# Patient Record
Sex: MALE | Race: WHITE | Employment: OTHER | URBAN - METROPOLITAN AREA
[De-identification: names, ages, dates, MRNs, and addresses within clinical notes are randomized per-mention and may not be internally consistent; named-entity substitution may affect disease eponyms.]

---

## 2017-04-02 PROBLEM — R26.81 UNSTEADY GAIT: Status: ACTIVE | Noted: 2017-04-02

## 2017-04-03 PROBLEM — R26.81 UNSTEADY GAIT: Status: RESOLVED | Noted: 2017-04-02 | Resolved: 2017-04-03

## 2017-04-03 PROBLEM — I95.1 ORTHOSTATIC HYPOTENSION: Chronic | Status: ACTIVE | Noted: 2017-04-03

## 2017-04-03 PROBLEM — Z86.718 HISTORY OF DVT (DEEP VEIN THROMBOSIS): Chronic | Status: ACTIVE | Noted: 2017-04-03

## 2017-04-03 PROBLEM — I50.33 ACUTE ON CHRONIC DIASTOLIC CONGESTIVE HEART FAILURE (HCC): Status: ACTIVE | Noted: 2017-04-03

## 2017-04-03 PROBLEM — J44.9 COPD (CHRONIC OBSTRUCTIVE PULMONARY DISEASE) (HCC): Chronic | Status: ACTIVE | Noted: 2017-04-03

## 2017-05-24 PROBLEM — A41.9 SEPTIC SHOCK (HCC): Status: ACTIVE | Noted: 2017-05-24

## 2017-05-24 PROBLEM — R65.21 SEPTIC SHOCK (HCC): Status: ACTIVE | Noted: 2017-05-24

## 2017-05-25 PROBLEM — E83.42 HYPOMAGNESEMIA: Status: ACTIVE | Noted: 2017-05-25

## 2017-05-25 PROBLEM — E83.39 HYPOPHOSPHATEMIA: Status: ACTIVE | Noted: 2017-05-25

## 2017-05-25 PROBLEM — E87.6 HYPOKALEMIA: Status: ACTIVE | Noted: 2017-05-25

## 2017-09-29 PROBLEM — E87.70 VOLUME OVERLOAD: Status: ACTIVE | Noted: 2017-09-29

## 2017-09-29 PROBLEM — R31.9 HEMATURIA: Status: ACTIVE | Noted: 2017-09-29

## 2017-10-01 PROBLEM — E44.0 MODERATE PROTEIN-CALORIE MALNUTRITION (HCC): Status: ACTIVE | Noted: 2017-10-01

## 2018-01-01 ENCOUNTER — APPOINTMENT (OUTPATIENT)
Dept: GENERAL RADIOLOGY | Age: 78
End: 2018-01-01
Payer: COMMERCIAL

## 2018-01-01 ENCOUNTER — HOSPITAL ENCOUNTER (INPATIENT)
Age: 78
LOS: 3 days | Discharge: NURSING, REHAB OR LONG-TERM CARE | DRG: 293 | End: 2018-06-07
Attending: EMERGENCY MEDICINE | Admitting: INTERNAL MEDICINE
Payer: COMMERCIAL

## 2018-01-01 ENCOUNTER — APPOINTMENT (OUTPATIENT)
Dept: GENERAL RADIOLOGY | Age: 78
DRG: 293 | End: 2018-01-01
Payer: COMMERCIAL

## 2018-01-01 ENCOUNTER — APPOINTMENT (OUTPATIENT)
Dept: CT IMAGING | Age: 78
DRG: 293 | End: 2018-01-01
Payer: COMMERCIAL

## 2018-01-01 ENCOUNTER — APPOINTMENT (OUTPATIENT)
Dept: CT IMAGING | Age: 78
End: 2018-01-01
Payer: COMMERCIAL

## 2018-01-01 ENCOUNTER — HOSPITAL ENCOUNTER (EMERGENCY)
Age: 78
Discharge: HOME | End: 2018-06-18
Attending: EMERGENCY MEDICINE
Payer: COMMERCIAL

## 2018-01-01 ENCOUNTER — HOSPITAL ENCOUNTER (INPATIENT)
Age: 78
LOS: 3 days | Discharge: NURSING, REHAB OR LONG-TERM CARE | DRG: 700 | End: 2018-04-19
Attending: EMERGENCY MEDICINE | Admitting: UROLOGY
Payer: COMMERCIAL

## 2018-01-01 ENCOUNTER — APPOINTMENT (OUTPATIENT)
Dept: CT IMAGING | Age: 78
DRG: 700 | End: 2018-01-01
Payer: COMMERCIAL

## 2018-01-01 VITALS
TEMPERATURE: 97.6 F | WEIGHT: 215 LBS | BODY MASS INDEX: 30.1 KG/M2 | RESPIRATION RATE: 18 BRPM | SYSTOLIC BLOOD PRESSURE: 133 MMHG | HEIGHT: 71 IN | HEART RATE: 79 BPM | DIASTOLIC BLOOD PRESSURE: 74 MMHG | OXYGEN SATURATION: 98 %

## 2018-01-01 VITALS
WEIGHT: 195 LBS | SYSTOLIC BLOOD PRESSURE: 114 MMHG | HEART RATE: 82 BPM | TEMPERATURE: 98.7 F | HEIGHT: 70 IN | RESPIRATION RATE: 18 BRPM | BODY MASS INDEX: 27.92 KG/M2 | DIASTOLIC BLOOD PRESSURE: 62 MMHG | OXYGEN SATURATION: 98 %

## 2018-01-01 VITALS
OXYGEN SATURATION: 98 % | BODY MASS INDEX: 30.1 KG/M2 | SYSTOLIC BLOOD PRESSURE: 102 MMHG | HEIGHT: 71 IN | TEMPERATURE: 97.6 F | WEIGHT: 215 LBS | HEART RATE: 75 BPM | RESPIRATION RATE: 16 BRPM | DIASTOLIC BLOOD PRESSURE: 60 MMHG

## 2018-01-01 DIAGNOSIS — R41.0 CONFUSION: Primary | ICD-10-CM

## 2018-01-01 DIAGNOSIS — N39.0 URINARY TRACT INFECTION WITH HEMATURIA, SITE UNSPECIFIED: ICD-10-CM

## 2018-01-01 DIAGNOSIS — R60.0 LEG EDEMA: Primary | ICD-10-CM

## 2018-01-01 DIAGNOSIS — N30.00 ACUTE CYSTITIS WITHOUT HEMATURIA: ICD-10-CM

## 2018-01-01 DIAGNOSIS — T83.9XXA PROBLEM WITH FOLEY CATHETER, INITIAL ENCOUNTER (HCC): ICD-10-CM

## 2018-01-01 DIAGNOSIS — N39.0 URINARY TRACT INFECTION WITHOUT HEMATURIA, SITE UNSPECIFIED: ICD-10-CM

## 2018-01-01 DIAGNOSIS — R33.9 URINARY RETENTION: Primary | ICD-10-CM

## 2018-01-01 DIAGNOSIS — R31.9 URINARY TRACT INFECTION WITH HEMATURIA, SITE UNSPECIFIED: ICD-10-CM

## 2018-01-01 DIAGNOSIS — R18.8 OTHER ASCITES: ICD-10-CM

## 2018-01-01 LAB
ACETAMINOPHEN LEVEL: <5 MCG/ML (ref 10–30)
ALBUMIN SERPL-MCNC: 2 G/DL (ref 3.5–5.2)
ALBUMIN SERPL-MCNC: 2.2 G/DL (ref 3.5–5.2)
ALBUMIN SERPL-MCNC: 2.7 G/DL (ref 3.5–5.2)
ALP BLD-CCNC: 60 U/L (ref 40–129)
ALP BLD-CCNC: 73 U/L (ref 40–129)
ALP BLD-CCNC: 80 U/L (ref 40–129)
ALT SERPL-CCNC: 10 U/L (ref 0–40)
ALT SERPL-CCNC: 17 U/L (ref 0–40)
ALT SERPL-CCNC: 8 U/L (ref 0–40)
AMMONIA: 23.7 UMOL/L (ref 16–60)
AMORPHOUS: ABNORMAL
AMPHETAMINE SCREEN, URINE: NOT DETECTED
ANION GAP SERPL CALCULATED.3IONS-SCNC: 10 MMOL/L (ref 7–16)
ANION GAP SERPL CALCULATED.3IONS-SCNC: 11 MMOL/L (ref 7–16)
ANION GAP SERPL CALCULATED.3IONS-SCNC: 12 MMOL/L (ref 7–16)
ANION GAP SERPL CALCULATED.3IONS-SCNC: 12 MMOL/L (ref 7–16)
ANION GAP SERPL CALCULATED.3IONS-SCNC: 9 MMOL/L (ref 7–16)
AST SERPL-CCNC: 13 U/L (ref 0–39)
AST SERPL-CCNC: 18 U/L (ref 0–39)
AST SERPL-CCNC: 31 U/L (ref 0–39)
BACTERIA: ABNORMAL /HPF
BARBITURATE SCREEN URINE: NOT DETECTED
BASOPHILS ABSOLUTE: 0 E9/L (ref 0–0.2)
BASOPHILS ABSOLUTE: 0.06 E9/L (ref 0–0.2)
BASOPHILS ABSOLUTE: 0.14 E9/L (ref 0–0.2)
BASOPHILS RELATIVE PERCENT: 0.9 % (ref 0–2)
BASOPHILS RELATIVE PERCENT: 1 % (ref 0–2)
BASOPHILS RELATIVE PERCENT: 2.6 % (ref 0–2)
BENZODIAZEPINE SCREEN, URINE: NOT DETECTED
BILIRUB SERPL-MCNC: 0.4 MG/DL (ref 0–1.2)
BILIRUB SERPL-MCNC: 0.4 MG/DL (ref 0–1.2)
BILIRUB SERPL-MCNC: 0.5 MG/DL (ref 0–1.2)
BILIRUBIN URINE: ABNORMAL
BILIRUBIN URINE: NEGATIVE
BILIRUBIN URINE: NEGATIVE
BLOOD CULTURE, ROUTINE: NORMAL
BLOOD, URINE: ABNORMAL
BUN BLDV-MCNC: 10 MG/DL (ref 8–23)
BUN BLDV-MCNC: 12 MG/DL (ref 8–23)
BUN BLDV-MCNC: 14 MG/DL (ref 8–23)
BUN BLDV-MCNC: 14 MG/DL (ref 8–23)
BUN BLDV-MCNC: 5 MG/DL (ref 8–23)
BUN BLDV-MCNC: 8 MG/DL (ref 8–23)
BUN BLDV-MCNC: 9 MG/DL (ref 8–23)
CALCIUM SERPL-MCNC: 7.4 MG/DL (ref 8.6–10.2)
CALCIUM SERPL-MCNC: 7.7 MG/DL (ref 8.6–10.2)
CALCIUM SERPL-MCNC: 7.8 MG/DL (ref 8.6–10.2)
CALCIUM SERPL-MCNC: 8.2 MG/DL (ref 8.6–10.2)
CALCIUM SERPL-MCNC: 8.3 MG/DL (ref 8.6–10.2)
CANNABINOID SCREEN URINE: NOT DETECTED
CHLORIDE BLD-SCNC: 100 MMOL/L (ref 98–107)
CHLORIDE BLD-SCNC: 101 MMOL/L (ref 98–107)
CHLORIDE BLD-SCNC: 101 MMOL/L (ref 98–107)
CHLORIDE BLD-SCNC: 103 MMOL/L (ref 98–107)
CHLORIDE BLD-SCNC: 99 MMOL/L (ref 98–107)
CHP ED QC CHECK: NORMAL
CLARITY: ABNORMAL
CO2: 24 MMOL/L (ref 22–29)
CO2: 25 MMOL/L (ref 22–29)
CO2: 26 MMOL/L (ref 22–29)
COCAINE METABOLITE SCREEN URINE: NOT DETECTED
COLOR: ABNORMAL
COLOR: YELLOW
COLOR: YELLOW
CREAT SERPL-MCNC: 0.7 MG/DL (ref 0.7–1.2)
CREAT SERPL-MCNC: 0.8 MG/DL (ref 0.7–1.2)
CREAT SERPL-MCNC: 0.8 MG/DL (ref 0.7–1.2)
CREAT SERPL-MCNC: 0.9 MG/DL (ref 0.7–1.2)
CRYSTALS, UA: ABNORMAL
EKG ATRIAL RATE: 74 BPM
EKG ATRIAL RATE: 81 BPM
EKG P AXIS: 12 DEGREES
EKG P AXIS: 63 DEGREES
EKG P-R INTERVAL: 164 MS
EKG P-R INTERVAL: 192 MS
EKG Q-T INTERVAL: 380 MS
EKG Q-T INTERVAL: 382 MS
EKG QRS DURATION: 74 MS
EKG QRS DURATION: 80 MS
EKG QTC CALCULATION (BAZETT): 424 MS
EKG QTC CALCULATION (BAZETT): 441 MS
EKG R AXIS: -12 DEGREES
EKG R AXIS: -6 DEGREES
EKG T AXIS: 53 DEGREES
EKG T AXIS: 63 DEGREES
EKG VENTRICULAR RATE: 74 BPM
EKG VENTRICULAR RATE: 81 BPM
EOSINOPHILS ABSOLUTE: 0.18 E9/L (ref 0.05–0.5)
EOSINOPHILS ABSOLUTE: 0.25 E9/L (ref 0.05–0.5)
EOSINOPHILS ABSOLUTE: 0.41 E9/L (ref 0.05–0.5)
EOSINOPHILS RELATIVE PERCENT: 2.8 % (ref 0–6)
EOSINOPHILS RELATIVE PERCENT: 3.5 % (ref 0–6)
EOSINOPHILS RELATIVE PERCENT: 7.8 % (ref 0–6)
EPITHELIAL CELLS, UA: ABNORMAL /HPF
EPITHELIAL CELLS, UA: ABNORMAL /HPF
ETHANOL: <10 MG/DL (ref 0–0.08)
GFR AFRICAN AMERICAN: >60
GFR NON-AFRICAN AMERICAN: >60 ML/MIN/1.73
GLUCOSE BLD-MCNC: 107 MG/DL (ref 74–109)
GLUCOSE BLD-MCNC: 150 MG/DL (ref 74–109)
GLUCOSE BLD-MCNC: 180 MG/DL (ref 74–109)
GLUCOSE BLD-MCNC: 185 MG/DL (ref 74–109)
GLUCOSE BLD-MCNC: 192 MG/DL
GLUCOSE BLD-MCNC: 205 MG/DL (ref 74–109)
GLUCOSE BLD-MCNC: 222 MG/DL (ref 74–109)
GLUCOSE BLD-MCNC: 226 MG/DL (ref 74–109)
GLUCOSE URINE: NEGATIVE MG/DL
HBA1C MFR BLD: 7.1 % (ref 4.8–5.9)
HCT VFR BLD CALC: 36.5 % (ref 37–54)
HCT VFR BLD CALC: 37.1 % (ref 37–54)
HCT VFR BLD CALC: 37.4 % (ref 37–54)
HCT VFR BLD CALC: 40.7 % (ref 37–54)
HCT VFR BLD CALC: 40.9 % (ref 37–54)
HEMOGLOBIN: 12.2 G/DL (ref 12.5–16.5)
HEMOGLOBIN: 12.4 G/DL (ref 12.5–16.5)
HEMOGLOBIN: 12.4 G/DL (ref 12.5–16.5)
HEMOGLOBIN: 13.6 G/DL (ref 12.5–16.5)
HEMOGLOBIN: 14 G/DL (ref 12.5–16.5)
IMMATURE GRANULOCYTES #: 0.04 E9/L
IMMATURE GRANULOCYTES %: 0.6 % (ref 0–5)
KETONES, URINE: 15 MG/DL
KETONES, URINE: ABNORMAL MG/DL
KETONES, URINE: NEGATIVE MG/DL
LACTIC ACID: 1.5 MMOL/L (ref 0.5–2.2)
LEUKOCYTE ESTERASE, URINE: ABNORMAL
LYMPHOCYTES ABSOLUTE: 0.14 E9/L (ref 1.5–4)
LYMPHOCYTES ABSOLUTE: 0.32 E9/L (ref 1.5–4)
LYMPHOCYTES ABSOLUTE: 0.51 E9/L (ref 1.5–4)
LYMPHOCYTES RELATIVE PERCENT: 1.7 % (ref 20–42)
LYMPHOCYTES RELATIVE PERCENT: 6.1 % (ref 20–42)
LYMPHOCYTES RELATIVE PERCENT: 8.1 % (ref 20–42)
MCH RBC QN AUTO: 30.5 PG (ref 26–35)
MCH RBC QN AUTO: 30.6 PG (ref 26–35)
MCH RBC QN AUTO: 30.8 PG (ref 26–35)
MCH RBC QN AUTO: 30.8 PG (ref 26–35)
MCH RBC QN AUTO: 31.5 PG (ref 26–35)
MCHC RBC AUTO-ENTMCNC: 32.9 % (ref 32–34.5)
MCHC RBC AUTO-ENTMCNC: 33.2 % (ref 32–34.5)
MCHC RBC AUTO-ENTMCNC: 33.3 % (ref 32–34.5)
MCHC RBC AUTO-ENTMCNC: 34 % (ref 32–34.5)
MCHC RBC AUTO-ENTMCNC: 34.4 % (ref 32–34.5)
MCV RBC AUTO: 89.9 FL (ref 80–99.9)
MCV RBC AUTO: 91.7 FL (ref 80–99.9)
MCV RBC AUTO: 92.3 FL (ref 80–99.9)
MCV RBC AUTO: 92.5 FL (ref 80–99.9)
MCV RBC AUTO: 93.7 FL (ref 80–99.9)
METER GLUCOSE: 122 MG/DL (ref 70–110)
METER GLUCOSE: 131 MG/DL (ref 70–110)
METER GLUCOSE: 134 MG/DL (ref 70–110)
METER GLUCOSE: 145 MG/DL (ref 70–110)
METER GLUCOSE: 162 MG/DL (ref 70–110)
METER GLUCOSE: 164 MG/DL (ref 70–110)
METER GLUCOSE: 166 MG/DL (ref 70–110)
METER GLUCOSE: 168 MG/DL (ref 70–110)
METER GLUCOSE: 183 MG/DL (ref 70–110)
METER GLUCOSE: 185 MG/DL (ref 70–110)
METER GLUCOSE: 192 MG/DL (ref 70–110)
METER GLUCOSE: 196 MG/DL (ref 70–110)
METER GLUCOSE: 200 MG/DL (ref 70–110)
METER GLUCOSE: 221 MG/DL (ref 70–110)
METER GLUCOSE: 221 MG/DL (ref 70–110)
METER GLUCOSE: 234 MG/DL (ref 70–110)
METER GLUCOSE: 242 MG/DL (ref 70–110)
METER GLUCOSE: 244 MG/DL (ref 70–110)
METER GLUCOSE: 248 MG/DL (ref 70–110)
METER GLUCOSE: 260 MG/DL (ref 70–110)
METER GLUCOSE: 267 MG/DL (ref 70–110)
METER GLUCOSE: 276 MG/DL (ref 70–110)
METER GLUCOSE: 294 MG/DL (ref 70–110)
METER GLUCOSE: 307 MG/DL (ref 70–110)
METER GLUCOSE: 312 MG/DL (ref 70–110)
METER GLUCOSE: 324 MG/DL (ref 70–110)
METER GLUCOSE: 341 MG/DL (ref 70–110)
METHADONE SCREEN, URINE: NOT DETECTED
MONOCYTES ABSOLUTE: 0.64 E9/L (ref 0.1–0.95)
MONOCYTES ABSOLUTE: 0.64 E9/L (ref 0.1–0.95)
MONOCYTES ABSOLUTE: 0.87 E9/L (ref 0.1–0.95)
MONOCYTES RELATIVE PERCENT: 12.2 % (ref 2–12)
MONOCYTES RELATIVE PERCENT: 13.7 % (ref 2–12)
MONOCYTES RELATIVE PERCENT: 8.7 % (ref 2–12)
NEUTROPHILS ABSOLUTE: 3.76 E9/L (ref 1.8–7.3)
NEUTROPHILS ABSOLUTE: 4.67 E9/L (ref 1.8–7.3)
NEUTROPHILS ABSOLUTE: 6.11 E9/L (ref 1.8–7.3)
NEUTROPHILS RELATIVE PERCENT: 71.3 % (ref 43–80)
NEUTROPHILS RELATIVE PERCENT: 73.9 % (ref 43–80)
NEUTROPHILS RELATIVE PERCENT: 86.1 % (ref 43–80)
NITRITE, URINE: POSITIVE
OPIATE SCREEN URINE: NOT DETECTED
ORGANISM: ABNORMAL
PDW BLD-RTO: 13.2 FL (ref 11.5–15)
PDW BLD-RTO: 13.6 FL (ref 11.5–15)
PDW BLD-RTO: 13.9 FL (ref 11.5–15)
PDW BLD-RTO: 14.3 FL (ref 11.5–15)
PDW BLD-RTO: 14.3 FL (ref 11.5–15)
PH UA: 5 (ref 5–9)
PH UA: 7 (ref 5–9)
PH UA: 7.5 (ref 5–9)
PHENCYCLIDINE SCREEN URINE: NOT DETECTED
PLATELET # BLD: 180 E9/L (ref 130–450)
PLATELET # BLD: 186 E9/L (ref 130–450)
PLATELET # BLD: 216 E9/L (ref 130–450)
PLATELET # BLD: 222 E9/L (ref 130–450)
PLATELET # BLD: 267 E9/L (ref 130–450)
PMV BLD AUTO: 10.2 FL (ref 7–12)
PMV BLD AUTO: 10.5 FL (ref 7–12)
PMV BLD AUTO: 10.7 FL (ref 7–12)
PMV BLD AUTO: 9.7 FL (ref 7–12)
PMV BLD AUTO: 9.8 FL (ref 7–12)
POLYCHROMASIA: ABNORMAL
POTASSIUM SERPL-SCNC: 3.6 MMOL/L (ref 3.5–5)
POTASSIUM SERPL-SCNC: 3.7 MMOL/L (ref 3.5–5)
POTASSIUM SERPL-SCNC: 3.8 MMOL/L (ref 3.5–5)
POTASSIUM SERPL-SCNC: 3.8 MMOL/L (ref 3.5–5)
POTASSIUM SERPL-SCNC: 4 MMOL/L (ref 3.5–5)
POTASSIUM SERPL-SCNC: 4.4 MMOL/L (ref 3.5–5)
POTASSIUM SERPL-SCNC: 5.5 MMOL/L (ref 3.5–5)
PRO-BNP: 729 PG/ML (ref 0–450)
PROPOXYPHENE SCREEN: NOT DETECTED
PROTEIN UA: 100 MG/DL
PROTEIN UA: 100 MG/DL
PROTEIN UA: ABNORMAL MG/DL
RBC # BLD: 3.96 E12/L (ref 3.8–5.8)
RBC # BLD: 4.05 E12/L (ref 3.8–5.8)
RBC # BLD: 4.06 E12/L (ref 3.8–5.8)
RBC # BLD: 4.42 E12/L (ref 3.8–5.8)
RBC # BLD: 4.44 E12/L (ref 3.8–5.8)
RBC # BLD: NORMAL 10*6/UL
RBC UA: ABNORMAL /HPF (ref 0–2)
SALICYLATE, SERUM: <0.3 MG/DL (ref 0–30)
SODIUM BLD-SCNC: 135 MMOL/L (ref 132–146)
SODIUM BLD-SCNC: 136 MMOL/L (ref 132–146)
SODIUM BLD-SCNC: 137 MMOL/L (ref 132–146)
SODIUM BLD-SCNC: 137 MMOL/L (ref 132–146)
SODIUM BLD-SCNC: 138 MMOL/L (ref 132–146)
SPECIFIC GRAVITY UA: 1.01 (ref 1–1.03)
SPECIFIC GRAVITY UA: 1.02 (ref 1–1.03)
SPECIFIC GRAVITY UA: >=1.03 (ref 1–1.03)
TOTAL PROTEIN: 5.1 G/DL (ref 6.4–8.3)
TOTAL PROTEIN: 5.3 G/DL (ref 6.4–8.3)
TOTAL PROTEIN: 6.6 G/DL (ref 6.4–8.3)
TRICYCLIC ANTIDEPRESSANTS SCREEN SERUM: NEGATIVE NG/ML
TROPONIN: 0.04 NG/ML (ref 0–0.03)
URINE CULTURE, ROUTINE: ABNORMAL
URINE CULTURE, ROUTINE: ABNORMAL
URINE CULTURE, ROUTINE: NORMAL
UROBILINOGEN, URINE: 0.2 E.U./DL
UROBILINOGEN, URINE: 0.2 E.U./DL
UROBILINOGEN, URINE: 1 E.U./DL
WBC # BLD: 4.6 E9/L (ref 4.5–11.5)
WBC # BLD: 5.3 E9/L (ref 4.5–11.5)
WBC # BLD: 6 E9/L (ref 4.5–11.5)
WBC # BLD: 6.3 E9/L (ref 4.5–11.5)
WBC # BLD: 7.1 E9/L (ref 4.5–11.5)
WBC UA: >20 /HPF (ref 0–5)
WBC UA: ABNORMAL /HPF (ref 0–5)
WBC UA: ABNORMAL /HPF (ref 0–5)

## 2018-01-01 PROCEDURE — 2580000003 HC RX 258: Performed by: INTERNAL MEDICINE

## 2018-01-01 PROCEDURE — 71046 X-RAY EXAM CHEST 2 VIEWS: CPT

## 2018-01-01 PROCEDURE — 96376 TX/PRO/DX INJ SAME DRUG ADON: CPT

## 2018-01-01 PROCEDURE — G0378 HOSPITAL OBSERVATION PER HR: HCPCS

## 2018-01-01 PROCEDURE — 6360000002 HC RX W HCPCS: Performed by: INTERNAL MEDICINE

## 2018-01-01 PROCEDURE — G8987 SELF CARE CURRENT STATUS: HCPCS

## 2018-01-01 PROCEDURE — G8979 MOBILITY GOAL STATUS: HCPCS | Performed by: PHYSICAL THERAPIST

## 2018-01-01 PROCEDURE — 82962 GLUCOSE BLOOD TEST: CPT

## 2018-01-01 PROCEDURE — 6370000000 HC RX 637 (ALT 250 FOR IP): Performed by: INTERNAL MEDICINE

## 2018-01-01 PROCEDURE — 84484 ASSAY OF TROPONIN QUANT: CPT

## 2018-01-01 PROCEDURE — 71045 X-RAY EXAM CHEST 1 VIEW: CPT

## 2018-01-01 PROCEDURE — 85027 COMPLETE CBC AUTOMATED: CPT

## 2018-01-01 PROCEDURE — 85025 COMPLETE CBC W/AUTO DIFF WBC: CPT

## 2018-01-01 PROCEDURE — 94760 N-INVAS EAR/PLS OXIMETRY 1: CPT

## 2018-01-01 PROCEDURE — 1200000000 HC SEMI PRIVATE

## 2018-01-01 PROCEDURE — 97535 SELF CARE MNGMENT TRAINING: CPT

## 2018-01-01 PROCEDURE — G8978 MOBILITY CURRENT STATUS: HCPCS

## 2018-01-01 PROCEDURE — 93005 ELECTROCARDIOGRAM TRACING: CPT | Performed by: EMERGENCY MEDICINE

## 2018-01-01 PROCEDURE — G8988 SELF CARE GOAL STATUS: HCPCS

## 2018-01-01 PROCEDURE — 80053 COMPREHEN METABOLIC PANEL: CPT

## 2018-01-01 PROCEDURE — 80048 BASIC METABOLIC PNL TOTAL CA: CPT

## 2018-01-01 PROCEDURE — 70450 CT HEAD/BRAIN W/O DYE: CPT

## 2018-01-01 PROCEDURE — 94664 DEMO&/EVAL PT USE INHALER: CPT

## 2018-01-01 PROCEDURE — 74176 CT ABD & PELVIS W/O CONTRAST: CPT

## 2018-01-01 PROCEDURE — 94640 AIRWAY INHALATION TREATMENT: CPT

## 2018-01-01 PROCEDURE — 36415 COLL VENOUS BLD VENIPUNCTURE: CPT

## 2018-01-01 PROCEDURE — 96374 THER/PROPH/DIAG INJ IV PUSH: CPT

## 2018-01-01 PROCEDURE — 97530 THERAPEUTIC ACTIVITIES: CPT

## 2018-01-01 PROCEDURE — 2700000000 HC OXYGEN THERAPY PER DAY

## 2018-01-01 PROCEDURE — 83605 ASSAY OF LACTIC ACID: CPT

## 2018-01-01 PROCEDURE — 2580000003 HC RX 258: Performed by: EMERGENCY MEDICINE

## 2018-01-01 PROCEDURE — G0480 DRUG TEST DEF 1-7 CLASSES: HCPCS

## 2018-01-01 PROCEDURE — 87186 SC STD MICRODIL/AGAR DIL: CPT

## 2018-01-01 PROCEDURE — 6360000002 HC RX W HCPCS: Performed by: EMERGENCY MEDICINE

## 2018-01-01 PROCEDURE — 83880 ASSAY OF NATRIURETIC PEPTIDE: CPT

## 2018-01-01 PROCEDURE — 99284 EMERGENCY DEPT VISIT MOD MDM: CPT

## 2018-01-01 PROCEDURE — 97162 PT EVAL MOD COMPLEX 30 MIN: CPT

## 2018-01-01 PROCEDURE — 87088 URINE BACTERIA CULTURE: CPT

## 2018-01-01 PROCEDURE — 97166 OT EVAL MOD COMPLEX 45 MIN: CPT

## 2018-01-01 PROCEDURE — G8979 MOBILITY GOAL STATUS: HCPCS

## 2018-01-01 PROCEDURE — 99285 EMERGENCY DEPT VISIT HI MDM: CPT

## 2018-01-01 PROCEDURE — 83036 HEMOGLOBIN GLYCOSYLATED A1C: CPT

## 2018-01-01 PROCEDURE — 87040 BLOOD CULTURE FOR BACTERIA: CPT

## 2018-01-01 PROCEDURE — G8978 MOBILITY CURRENT STATUS: HCPCS | Performed by: PHYSICAL THERAPIST

## 2018-01-01 PROCEDURE — 97110 THERAPEUTIC EXERCISES: CPT

## 2018-01-01 PROCEDURE — 81001 URINALYSIS AUTO W/SCOPE: CPT

## 2018-01-01 PROCEDURE — 82140 ASSAY OF AMMONIA: CPT

## 2018-01-01 PROCEDURE — 97161 PT EVAL LOW COMPLEX 20 MIN: CPT | Performed by: PHYSICAL THERAPIST

## 2018-01-01 PROCEDURE — 87077 CULTURE AEROBIC IDENTIFY: CPT

## 2018-01-01 PROCEDURE — 80307 DRUG TEST PRSMV CHEM ANLYZR: CPT

## 2018-01-01 RX ORDER — SODIUM CHLORIDE 0.9 % (FLUSH) 0.9 %
10 SYRINGE (ML) INJECTION EVERY 12 HOURS SCHEDULED
Status: DISCONTINUED | OUTPATIENT
Start: 2018-01-01 | End: 2018-01-01 | Stop reason: HOSPADM

## 2018-01-01 RX ORDER — SPIRONOLACTONE 25 MG/1
50 TABLET ORAL DAILY
Status: DISCONTINUED | OUTPATIENT
Start: 2018-01-01 | End: 2018-01-01 | Stop reason: HOSPADM

## 2018-01-01 RX ORDER — DEXTROSE MONOHYDRATE 50 MG/ML
100 INJECTION, SOLUTION INTRAVENOUS PRN
Status: DISCONTINUED | OUTPATIENT
Start: 2018-01-01 | End: 2018-01-01 | Stop reason: HOSPADM

## 2018-01-01 RX ORDER — CEPHALEXIN 250 MG/1
250 CAPSULE ORAL 3 TIMES DAILY
Qty: 21 CAPSULE | Refills: 0 | Status: SHIPPED | OUTPATIENT
Start: 2018-01-01 | End: 2018-01-01 | Stop reason: HOSPADM

## 2018-01-01 RX ORDER — FUROSEMIDE 20 MG/1
20 TABLET ORAL 2 TIMES DAILY
Status: DISCONTINUED | OUTPATIENT
Start: 2018-01-01 | End: 2018-01-01 | Stop reason: HOSPADM

## 2018-01-01 RX ORDER — DIVALPROEX SODIUM 250 MG/1
250 TABLET, EXTENDED RELEASE ORAL 2 TIMES DAILY
Status: DISCONTINUED | OUTPATIENT
Start: 2018-01-01 | End: 2018-01-01 | Stop reason: HOSPADM

## 2018-01-01 RX ORDER — THIAMINE MONONITRATE (VIT B1) 100 MG
100 TABLET ORAL DAILY
Status: DISCONTINUED | OUTPATIENT
Start: 2018-01-01 | End: 2018-01-01 | Stop reason: HOSPADM

## 2018-01-01 RX ORDER — ASPIRIN 81 MG/1
81 TABLET ORAL DAILY
Status: DISCONTINUED | OUTPATIENT
Start: 2018-01-01 | End: 2018-01-01 | Stop reason: HOSPADM

## 2018-01-01 RX ORDER — POTASSIUM CHLORIDE 20 MEQ/1
20 TABLET, EXTENDED RELEASE ORAL EVERY OTHER DAY
Status: DISCONTINUED | OUTPATIENT
Start: 2018-01-01 | End: 2018-01-01 | Stop reason: HOSPADM

## 2018-01-01 RX ORDER — DONEPEZIL HYDROCHLORIDE 5 MG/1
5 TABLET, FILM COATED ORAL NIGHTLY
Status: DISCONTINUED | OUTPATIENT
Start: 2018-01-01 | End: 2018-01-01 | Stop reason: HOSPADM

## 2018-01-01 RX ORDER — INSULIN GLARGINE 100 [IU]/ML
15 INJECTION, SOLUTION SUBCUTANEOUS NIGHTLY
Status: DISCONTINUED | OUTPATIENT
Start: 2018-01-01 | End: 2018-01-01 | Stop reason: HOSPADM

## 2018-01-01 RX ORDER — NICOTINE POLACRILEX 4 MG
15 LOZENGE BUCCAL PRN
Status: DISCONTINUED | OUTPATIENT
Start: 2018-01-01 | End: 2018-01-01 | Stop reason: HOSPADM

## 2018-01-01 RX ORDER — LANOLIN ALCOHOL/MO/W.PET/CERES
400 CREAM (GRAM) TOPICAL DAILY
Status: DISCONTINUED | OUTPATIENT
Start: 2018-01-01 | End: 2018-01-01 | Stop reason: HOSPADM

## 2018-01-01 RX ORDER — CEFTRIAXONE 1 G/1
1 INJECTION, POWDER, FOR SOLUTION INTRAMUSCULAR; INTRAVENOUS ONCE
Status: COMPLETED | OUTPATIENT
Start: 2018-01-01 | End: 2018-01-01

## 2018-01-01 RX ORDER — MIDODRINE HYDROCHLORIDE 5 MG/1
10 TABLET ORAL 3 TIMES DAILY
Status: DISCONTINUED | OUTPATIENT
Start: 2018-01-01 | End: 2018-01-01 | Stop reason: HOSPADM

## 2018-01-01 RX ORDER — CEFDINIR 300 MG/1
300 CAPSULE ORAL 2 TIMES DAILY
Qty: 20 CAPSULE | Refills: 0 | Status: SHIPPED | OUTPATIENT
Start: 2018-01-01 | End: 2018-01-01

## 2018-01-01 RX ORDER — CITALOPRAM 20 MG/1
20 TABLET ORAL DAILY
Status: DISCONTINUED | OUTPATIENT
Start: 2018-01-01 | End: 2018-01-01 | Stop reason: HOSPADM

## 2018-01-01 RX ORDER — SODIUM CHLORIDE 0.9 % (FLUSH) 0.9 %
10 SYRINGE (ML) INJECTION PRN
Status: DISCONTINUED | OUTPATIENT
Start: 2018-01-01 | End: 2018-01-01 | Stop reason: HOSPADM

## 2018-01-01 RX ORDER — INSULIN GLARGINE 100 [IU]/ML
10 INJECTION, SOLUTION SUBCUTANEOUS NIGHTLY
Status: DISCONTINUED | OUTPATIENT
Start: 2018-01-01 | End: 2018-01-01

## 2018-01-01 RX ORDER — FERROUS SULFATE 325(65) MG
325 TABLET ORAL 2 TIMES DAILY WITH MEALS
Status: DISCONTINUED | OUTPATIENT
Start: 2018-01-01 | End: 2018-01-01

## 2018-01-01 RX ORDER — SODIUM CHLORIDE 0.9 % (FLUSH) 0.9 %
10 SYRINGE (ML) INJECTION EVERY 12 HOURS SCHEDULED
Status: CANCELLED | OUTPATIENT
Start: 2018-01-01

## 2018-01-01 RX ORDER — LOPERAMIDE HYDROCHLORIDE 2 MG/1
2 CAPSULE ORAL 4 TIMES DAILY PRN
DISCHARGE
Start: 2018-01-01 | End: 2018-01-01

## 2018-01-01 RX ORDER — INSULIN GLARGINE 100 [IU]/ML
10 INJECTION, SOLUTION SUBCUTANEOUS NIGHTLY
Status: DISCONTINUED | OUTPATIENT
Start: 2018-01-01 | End: 2018-01-01 | Stop reason: HOSPADM

## 2018-01-01 RX ORDER — INSULIN GLARGINE 100 [IU]/ML
10 INJECTION, SOLUTION SUBCUTANEOUS NIGHTLY
Status: ON HOLD | COMMUNITY
End: 2019-01-01 | Stop reason: HOSPADM

## 2018-01-01 RX ORDER — PANTOPRAZOLE SODIUM 40 MG/1
40 TABLET, DELAYED RELEASE ORAL DAILY
Status: DISCONTINUED | OUTPATIENT
Start: 2018-01-01 | End: 2018-01-01 | Stop reason: HOSPADM

## 2018-01-01 RX ORDER — FOLIC ACID 1 MG/1
1 TABLET ORAL DAILY
Status: DISCONTINUED | OUTPATIENT
Start: 2018-01-01 | End: 2018-01-01 | Stop reason: HOSPADM

## 2018-01-01 RX ORDER — LOPERAMIDE HYDROCHLORIDE 2 MG/1
2 CAPSULE ORAL 4 TIMES DAILY PRN
Status: DISCONTINUED | OUTPATIENT
Start: 2018-01-01 | End: 2018-01-01 | Stop reason: HOSPADM

## 2018-01-01 RX ORDER — IPRATROPIUM BROMIDE AND ALBUTEROL SULFATE 2.5; .5 MG/3ML; MG/3ML
3 SOLUTION RESPIRATORY (INHALATION)
Status: DISCONTINUED | OUTPATIENT
Start: 2018-01-01 | End: 2018-01-01 | Stop reason: HOSPADM

## 2018-01-01 RX ORDER — DEXTROSE MONOHYDRATE 25 G/50ML
12.5 INJECTION, SOLUTION INTRAVENOUS PRN
Status: DISCONTINUED | OUTPATIENT
Start: 2018-01-01 | End: 2018-01-01 | Stop reason: HOSPADM

## 2018-01-01 RX ORDER — SODIUM CHLORIDE 0.9 % (FLUSH) 0.9 %
10 SYRINGE (ML) INJECTION PRN
Status: CANCELLED | OUTPATIENT
Start: 2018-01-01

## 2018-01-01 RX ORDER — ACETAMINOPHEN 325 MG/1
650 TABLET ORAL EVERY 4 HOURS PRN
Status: DISCONTINUED | OUTPATIENT
Start: 2018-01-01 | End: 2018-01-01 | Stop reason: HOSPADM

## 2018-01-01 RX ORDER — FUROSEMIDE 10 MG/ML
20 INJECTION INTRAMUSCULAR; INTRAVENOUS 2 TIMES DAILY
Status: DISCONTINUED | OUTPATIENT
Start: 2018-01-01 | End: 2018-01-01 | Stop reason: HOSPADM

## 2018-01-01 RX ORDER — FLUTICASONE PROPIONATE 50 MCG
2 SPRAY, SUSPENSION (ML) NASAL DAILY
Status: DISCONTINUED | OUTPATIENT
Start: 2018-01-01 | End: 2018-01-01 | Stop reason: HOSPADM

## 2018-01-01 RX ORDER — IPRATROPIUM BROMIDE AND ALBUTEROL SULFATE 2.5; .5 MG/3ML; MG/3ML
3 SOLUTION RESPIRATORY (INHALATION) 4 TIMES DAILY
Status: DISCONTINUED | OUTPATIENT
Start: 2018-01-01 | End: 2018-01-01 | Stop reason: HOSPADM

## 2018-01-01 RX ORDER — FERROUS SULFATE 325(65) MG
325 TABLET ORAL 2 TIMES DAILY WITH MEALS
Status: DISCONTINUED | OUTPATIENT
Start: 2018-01-01 | End: 2018-01-01 | Stop reason: HOSPADM

## 2018-01-01 RX ORDER — ACETAMINOPHEN 325 MG/1
650 TABLET ORAL EVERY 4 HOURS PRN
Status: CANCELLED | OUTPATIENT
Start: 2018-01-01

## 2018-01-01 RX ADMIN — PANTOPRAZOLE SODIUM 40 MG: 40 TABLET, DELAYED RELEASE ORAL at 08:31

## 2018-01-01 RX ADMIN — SPIRONOLACTONE 50 MG: 25 TABLET ORAL at 08:31

## 2018-01-01 RX ADMIN — MIDODRINE HYDROCHLORIDE 10 MG: 5 TABLET ORAL at 08:32

## 2018-01-01 RX ADMIN — INSULIN LISPRO 2 UNITS: 100 INJECTION, SOLUTION INTRAVENOUS; SUBCUTANEOUS at 17:07

## 2018-01-01 RX ADMIN — Medication 10 ML: at 20:49

## 2018-01-01 RX ADMIN — MIDODRINE HYDROCHLORIDE 10 MG: 5 TABLET ORAL at 15:25

## 2018-01-01 RX ADMIN — DIVALPROEX SODIUM 250 MG: 250 TABLET, FILM COATED, EXTENDED RELEASE ORAL at 09:24

## 2018-01-01 RX ADMIN — APIXABAN 5 MG: 5 TABLET, FILM COATED ORAL at 20:49

## 2018-01-01 RX ADMIN — IPRATROPIUM BROMIDE AND ALBUTEROL SULFATE 3 ML: 2.5; .5 SOLUTION RESPIRATORY (INHALATION) at 22:59

## 2018-01-01 RX ADMIN — CITALOPRAM 20 MG: 20 TABLET, FILM COATED ORAL at 11:36

## 2018-01-01 RX ADMIN — INSULIN LISPRO 1 UNITS: 100 INJECTION, SOLUTION INTRAVENOUS; SUBCUTANEOUS at 12:53

## 2018-01-01 RX ADMIN — Medication 10 ML: at 22:12

## 2018-01-01 RX ADMIN — MIDODRINE HYDROCHLORIDE 10 MG: 5 TABLET ORAL at 08:44

## 2018-01-01 RX ADMIN — Medication 100 MG: at 08:44

## 2018-01-01 RX ADMIN — MAGNESIUM GLUCONATE 500 MG ORAL TABLET 400 MG: 500 TABLET ORAL at 11:35

## 2018-01-01 RX ADMIN — IPRATROPIUM BROMIDE AND ALBUTEROL SULFATE 3 ML: 2.5; .5 SOLUTION RESPIRATORY (INHALATION) at 09:10

## 2018-01-01 RX ADMIN — IPRATROPIUM BROMIDE AND ALBUTEROL SULFATE 3 ML: 2.5; .5 SOLUTION RESPIRATORY (INHALATION) at 09:23

## 2018-01-01 RX ADMIN — INSULIN LISPRO 2 UNITS: 100 INJECTION, SOLUTION INTRAVENOUS; SUBCUTANEOUS at 23:41

## 2018-01-01 RX ADMIN — CITALOPRAM 20 MG: 20 TABLET, FILM COATED ORAL at 10:12

## 2018-01-01 RX ADMIN — POTASSIUM CHLORIDE 20 MEQ: 20 TABLET, EXTENDED RELEASE ORAL at 08:44

## 2018-01-01 RX ADMIN — FOLIC ACID 1 MG: 1 TABLET ORAL at 09:46

## 2018-01-01 RX ADMIN — DIVALPROEX SODIUM 250 MG: 250 TABLET, FILM COATED, EXTENDED RELEASE ORAL at 08:23

## 2018-01-01 RX ADMIN — Medication 100 MG: at 09:46

## 2018-01-01 RX ADMIN — DIVALPROEX SODIUM 250 MG: 250 TABLET, EXTENDED RELEASE ORAL at 17:27

## 2018-01-01 RX ADMIN — MIDODRINE HYDROCHLORIDE 10 MG: 5 TABLET ORAL at 13:12

## 2018-01-01 RX ADMIN — PANTOPRAZOLE SODIUM 40 MG: 40 TABLET, DELAYED RELEASE ORAL at 11:36

## 2018-01-01 RX ADMIN — CEFTRIAXONE 1 G: 1 INJECTION, POWDER, FOR SOLUTION INTRAMUSCULAR; INTRAVENOUS at 17:50

## 2018-01-01 RX ADMIN — MOMETASONE FUROATE AND FORMOTEROL FUMARATE DIHYDRATE 2 PUFF: 200; 5 AEROSOL RESPIRATORY (INHALATION) at 09:49

## 2018-01-01 RX ADMIN — FOLIC ACID 1 MG: 1 TABLET ORAL at 10:12

## 2018-01-01 RX ADMIN — MIDODRINE HYDROCHLORIDE 10 MG: 5 TABLET ORAL at 15:28

## 2018-01-01 RX ADMIN — CITALOPRAM 20 MG: 20 TABLET, FILM COATED ORAL at 08:44

## 2018-01-01 RX ADMIN — FUROSEMIDE 20 MG: 10 INJECTION, SOLUTION INTRAVENOUS at 17:27

## 2018-01-01 RX ADMIN — INSULIN LISPRO 3 UNITS: 100 INJECTION, SOLUTION INTRAVENOUS; SUBCUTANEOUS at 12:28

## 2018-01-01 RX ADMIN — INSULIN LISPRO 8 UNITS: 100 INJECTION, SOLUTION INTRAVENOUS; SUBCUTANEOUS at 11:18

## 2018-01-01 RX ADMIN — INSULIN GLARGINE 15 UNITS: 100 INJECTION, SOLUTION SUBCUTANEOUS at 11:00

## 2018-01-01 RX ADMIN — INSULIN LISPRO 2 UNITS: 100 INJECTION, SOLUTION INTRAVENOUS; SUBCUTANEOUS at 08:23

## 2018-01-01 RX ADMIN — MOMETASONE FUROATE AND FORMOTEROL FUMARATE DIHYDRATE 2 PUFF: 200; 5 AEROSOL RESPIRATORY (INHALATION) at 23:40

## 2018-01-01 RX ADMIN — FERROUS SULFATE TAB 325 MG (65 MG ELEMENTAL FE) 325 MG: 325 (65 FE) TAB at 11:35

## 2018-01-01 RX ADMIN — FUROSEMIDE 20 MG: 20 TABLET ORAL at 08:21

## 2018-01-01 RX ADMIN — FERROUS SULFATE TAB 325 MG (65 MG ELEMENTAL FE) 325 MG: 325 (65 FE) TAB at 09:46

## 2018-01-01 RX ADMIN — APIXABAN 5 MG: 5 TABLET, FILM COATED ORAL at 11:36

## 2018-01-01 RX ADMIN — FUROSEMIDE 20 MG: 10 INJECTION, SOLUTION INTRAVENOUS at 10:13

## 2018-01-01 RX ADMIN — IPRATROPIUM BROMIDE AND ALBUTEROL SULFATE 3 ML: 2.5; .5 SOLUTION RESPIRATORY (INHALATION) at 08:19

## 2018-01-01 RX ADMIN — MIDODRINE HYDROCHLORIDE 10 MG: 5 TABLET ORAL at 09:24

## 2018-01-01 RX ADMIN — SPIRONOLACTONE 50 MG: 25 TABLET ORAL at 11:35

## 2018-01-01 RX ADMIN — CITALOPRAM 20 MG: 20 TABLET, FILM COATED ORAL at 09:46

## 2018-01-01 RX ADMIN — ASPIRIN 81 MG: 81 TABLET ORAL at 11:36

## 2018-01-01 RX ADMIN — DEXTROSE MONOHYDRATE 1 G: 5 INJECTION, SOLUTION INTRAVENOUS at 06:58

## 2018-01-01 RX ADMIN — INSULIN GLARGINE 10 UNITS: 100 INJECTION, SOLUTION SUBCUTANEOUS at 21:18

## 2018-01-01 RX ADMIN — FOLIC ACID 1 MG: 1 TABLET ORAL at 09:24

## 2018-01-01 RX ADMIN — MIDODRINE HYDROCHLORIDE 10 MG: 5 TABLET ORAL at 20:49

## 2018-01-01 RX ADMIN — APIXABAN 5 MG: 5 TABLET, FILM COATED ORAL at 09:25

## 2018-01-01 RX ADMIN — INSULIN LISPRO 2 UNITS: 100 INJECTION, SOLUTION INTRAVENOUS; SUBCUTANEOUS at 13:14

## 2018-01-01 RX ADMIN — FERROUS SULFATE TAB 325 MG (65 MG ELEMENTAL FE) 325 MG: 325 (65 FE) TAB at 10:12

## 2018-01-01 RX ADMIN — MIDODRINE HYDROCHLORIDE 10 MG: 5 TABLET ORAL at 22:11

## 2018-01-01 RX ADMIN — INSULIN LISPRO 1 UNITS: 100 INJECTION, SOLUTION INTRAVENOUS; SUBCUTANEOUS at 21:20

## 2018-01-01 RX ADMIN — IPRATROPIUM BROMIDE AND ALBUTEROL SULFATE 3 ML: 2.5; .5 SOLUTION RESPIRATORY (INHALATION) at 16:08

## 2018-01-01 RX ADMIN — MIDODRINE HYDROCHLORIDE 10 MG: 5 TABLET ORAL at 23:34

## 2018-01-01 RX ADMIN — Medication 10 ML: at 23:37

## 2018-01-01 RX ADMIN — Medication 100 MG: at 11:35

## 2018-01-01 RX ADMIN — INSULIN LISPRO 4 UNITS: 100 INJECTION, SOLUTION INTRAVENOUS; SUBCUTANEOUS at 18:02

## 2018-01-01 RX ADMIN — SPIRONOLACTONE 50 MG: 25 TABLET ORAL at 08:44

## 2018-01-01 RX ADMIN — INSULIN GLARGINE 10 UNITS: 100 INJECTION, SOLUTION SUBCUTANEOUS at 21:01

## 2018-01-01 RX ADMIN — INSULIN LISPRO 1 UNITS: 100 INJECTION, SOLUTION INTRAVENOUS; SUBCUTANEOUS at 21:10

## 2018-01-01 RX ADMIN — IPRATROPIUM BROMIDE AND ALBUTEROL SULFATE 3 ML: 2.5; .5 SOLUTION RESPIRATORY (INHALATION) at 16:30

## 2018-01-01 RX ADMIN — FUROSEMIDE 20 MG: 10 INJECTION, SOLUTION INTRAVENOUS at 11:36

## 2018-01-01 RX ADMIN — INSULIN LISPRO 8 UNITS: 100 INJECTION, SOLUTION INTRAVENOUS; SUBCUTANEOUS at 16:40

## 2018-01-01 RX ADMIN — MIDODRINE HYDROCHLORIDE 10 MG: 5 TABLET ORAL at 14:18

## 2018-01-01 RX ADMIN — DIVALPROEX SODIUM 250 MG: 250 TABLET, EXTENDED RELEASE ORAL at 08:44

## 2018-01-01 RX ADMIN — MOMETASONE FUROATE AND FORMOTEROL FUMARATE DIHYDRATE 2 PUFF: 200; 5 AEROSOL RESPIRATORY (INHALATION) at 08:22

## 2018-01-01 RX ADMIN — MAGNESIUM GLUCONATE 500 MG ORAL TABLET 400 MG: 500 TABLET ORAL at 08:44

## 2018-01-01 RX ADMIN — DIVALPROEX SODIUM 250 MG: 250 TABLET, EXTENDED RELEASE ORAL at 18:02

## 2018-01-01 RX ADMIN — ASPIRIN 81 MG: 81 TABLET ORAL at 09:46

## 2018-01-01 RX ADMIN — MIDODRINE HYDROCHLORIDE 10 MG: 5 TABLET ORAL at 13:13

## 2018-01-01 RX ADMIN — FUROSEMIDE 20 MG: 20 TABLET ORAL at 22:11

## 2018-01-01 RX ADMIN — IPRATROPIUM BROMIDE AND ALBUTEROL SULFATE 3 ML: 2.5; .5 SOLUTION RESPIRATORY (INHALATION) at 10:38

## 2018-01-01 RX ADMIN — IPRATROPIUM BROMIDE AND ALBUTEROL SULFATE 3 ML: 2.5; .5 SOLUTION RESPIRATORY (INHALATION) at 11:09

## 2018-01-01 RX ADMIN — IPRATROPIUM BROMIDE AND ALBUTEROL SULFATE 3 ML: 2.5; .5 SOLUTION RESPIRATORY (INHALATION) at 15:28

## 2018-01-01 RX ADMIN — Medication 10 ML: at 10:13

## 2018-01-01 RX ADMIN — Medication 10 ML: at 08:31

## 2018-01-01 RX ADMIN — MOMETASONE FUROATE AND FORMOTEROL FUMARATE DIHYDRATE 2 PUFF: 200; 5 AEROSOL RESPIRATORY (INHALATION) at 20:00

## 2018-01-01 RX ADMIN — APIXABAN 5 MG: 5 TABLET, FILM COATED ORAL at 10:12

## 2018-01-01 RX ADMIN — INSULIN LISPRO 3 UNITS: 100 INJECTION, SOLUTION INTRAVENOUS; SUBCUTANEOUS at 12:17

## 2018-01-01 RX ADMIN — ASPIRIN 81 MG: 81 TABLET ORAL at 10:12

## 2018-01-01 RX ADMIN — MOMETASONE FUROATE AND FORMOTEROL FUMARATE DIHYDRATE 2 PUFF: 200; 5 AEROSOL RESPIRATORY (INHALATION) at 21:09

## 2018-01-01 RX ADMIN — DONEPEZIL HYDROCHLORIDE 5 MG: 5 TABLET, FILM COATED ORAL at 23:39

## 2018-01-01 RX ADMIN — FUROSEMIDE 20 MG: 10 INJECTION, SOLUTION INTRAVENOUS at 18:02

## 2018-01-01 RX ADMIN — IPRATROPIUM BROMIDE AND ALBUTEROL SULFATE 3 ML: 2.5; .5 SOLUTION RESPIRATORY (INHALATION) at 20:41

## 2018-01-01 RX ADMIN — FUROSEMIDE 20 MG: 20 TABLET ORAL at 08:31

## 2018-01-01 RX ADMIN — DONEPEZIL HYDROCHLORIDE 5 MG: 5 TABLET, FILM COATED ORAL at 23:35

## 2018-01-01 RX ADMIN — IPRATROPIUM BROMIDE AND ALBUTEROL SULFATE 3 ML: 2.5; .5 SOLUTION RESPIRATORY (INHALATION) at 20:02

## 2018-01-01 RX ADMIN — ASPIRIN 81 MG: 81 TABLET ORAL at 08:44

## 2018-01-01 RX ADMIN — Medication 10 ML: at 08:22

## 2018-01-01 RX ADMIN — FUROSEMIDE 20 MG: 10 INJECTION, SOLUTION INTRAVENOUS at 09:48

## 2018-01-01 RX ADMIN — APIXABAN 5 MG: 5 TABLET, FILM COATED ORAL at 21:09

## 2018-01-01 RX ADMIN — APIXABAN 5 MG: 5 TABLET, FILM COATED ORAL at 21:16

## 2018-01-01 RX ADMIN — MIDODRINE HYDROCHLORIDE 10 MG: 5 TABLET ORAL at 10:13

## 2018-01-01 RX ADMIN — DONEPEZIL HYDROCHLORIDE 5 MG: 5 TABLET, FILM COATED ORAL at 20:48

## 2018-01-01 RX ADMIN — MIDODRINE HYDROCHLORIDE 10 MG: 5 TABLET ORAL at 21:16

## 2018-01-01 RX ADMIN — APIXABAN 5 MG: 5 TABLET, FILM COATED ORAL at 23:35

## 2018-01-01 RX ADMIN — FERROUS SULFATE TAB 325 MG (65 MG ELEMENTAL FE) 325 MG: 325 (65 FE) TAB at 17:27

## 2018-01-01 RX ADMIN — FUROSEMIDE 20 MG: 20 TABLET ORAL at 09:24

## 2018-01-01 RX ADMIN — MOMETASONE FUROATE AND FORMOTEROL FUMARATE DIHYDRATE 2 PUFF: 200; 5 AEROSOL RESPIRATORY (INHALATION) at 21:17

## 2018-01-01 RX ADMIN — IPRATROPIUM BROMIDE AND ALBUTEROL SULFATE 3 ML: 2.5; .5 SOLUTION RESPIRATORY (INHALATION) at 16:00

## 2018-01-01 RX ADMIN — INSULIN LISPRO 1 UNITS: 100 INJECTION, SOLUTION INTRAVENOUS; SUBCUTANEOUS at 09:33

## 2018-01-01 RX ADMIN — APIXABAN 5 MG: 5 TABLET, FILM COATED ORAL at 09:47

## 2018-01-01 RX ADMIN — FERROUS SULFATE TAB 325 MG (65 MG ELEMENTAL FE) 325 MG: 325 (65 FE) TAB at 18:02

## 2018-01-01 RX ADMIN — SPIRONOLACTONE 50 MG: 25 TABLET ORAL at 08:21

## 2018-01-01 RX ADMIN — Medication 10 ML: at 21:17

## 2018-01-01 RX ADMIN — APIXABAN 5 MG: 5 TABLET, FILM COATED ORAL at 08:31

## 2018-01-01 RX ADMIN — Medication 10 ML: at 09:24

## 2018-01-01 RX ADMIN — INSULIN LISPRO 2 UNITS: 100 INJECTION, SOLUTION INTRAVENOUS; SUBCUTANEOUS at 09:27

## 2018-01-01 RX ADMIN — Medication 100 MG: at 10:12

## 2018-01-01 RX ADMIN — PANTOPRAZOLE SODIUM 40 MG: 40 TABLET, DELAYED RELEASE ORAL at 09:23

## 2018-01-01 RX ADMIN — DIVALPROEX SODIUM 250 MG: 250 TABLET, EXTENDED RELEASE ORAL at 18:01

## 2018-01-01 RX ADMIN — SPIRONOLACTONE 50 MG: 25 TABLET ORAL at 09:46

## 2018-01-01 RX ADMIN — CITALOPRAM 20 MG: 20 TABLET, FILM COATED ORAL at 08:22

## 2018-01-01 RX ADMIN — APIXABAN 5 MG: 5 TABLET, FILM COATED ORAL at 08:44

## 2018-01-01 RX ADMIN — INSULIN LISPRO 4 UNITS: 100 INJECTION, SOLUTION INTRAVENOUS; SUBCUTANEOUS at 11:23

## 2018-01-01 RX ADMIN — FUROSEMIDE 20 MG: 20 TABLET ORAL at 21:15

## 2018-01-01 RX ADMIN — APIXABAN 5 MG: 5 TABLET, FILM COATED ORAL at 23:39

## 2018-01-01 RX ADMIN — FERROUS SULFATE TAB 325 MG (65 MG ELEMENTAL FE) 325 MG: 325 (65 FE) TAB at 18:01

## 2018-01-01 RX ADMIN — IPRATROPIUM BROMIDE AND ALBUTEROL SULFATE 3 ML: 2.5; .5 SOLUTION RESPIRATORY (INHALATION) at 12:51

## 2018-01-01 RX ADMIN — INSULIN LISPRO 4 UNITS: 100 INJECTION, SOLUTION INTRAVENOUS; SUBCUTANEOUS at 12:49

## 2018-01-01 RX ADMIN — MAGNESIUM GLUCONATE 500 MG ORAL TABLET 400 MG: 500 TABLET ORAL at 09:24

## 2018-01-01 RX ADMIN — IPRATROPIUM BROMIDE AND ALBUTEROL SULFATE 3 ML: 2.5; .5 SOLUTION RESPIRATORY (INHALATION) at 20:10

## 2018-01-01 RX ADMIN — IPRATROPIUM BROMIDE AND ALBUTEROL SULFATE 3 ML: 2.5; .5 SOLUTION RESPIRATORY (INHALATION) at 10:44

## 2018-01-01 RX ADMIN — PANTOPRAZOLE SODIUM 40 MG: 40 TABLET, DELAYED RELEASE ORAL at 08:21

## 2018-01-01 RX ADMIN — MIDODRINE HYDROCHLORIDE 10 MG: 5 TABLET ORAL at 14:10

## 2018-01-01 RX ADMIN — FERROUS SULFATE TAB 325 MG (65 MG ELEMENTAL FE) 325 MG: 325 (65 FE) TAB at 08:44

## 2018-01-01 RX ADMIN — DIVALPROEX SODIUM 250 MG: 250 TABLET, FILM COATED, EXTENDED RELEASE ORAL at 18:04

## 2018-01-01 RX ADMIN — FOLIC ACID 1 MG: 1 TABLET ORAL at 08:21

## 2018-01-01 RX ADMIN — MAGNESIUM GLUCONATE 500 MG ORAL TABLET 400 MG: 500 TABLET ORAL at 08:31

## 2018-01-01 RX ADMIN — PANTOPRAZOLE SODIUM 40 MG: 40 TABLET, DELAYED RELEASE ORAL at 09:46

## 2018-01-01 RX ADMIN — DIVALPROEX SODIUM 250 MG: 250 TABLET, EXTENDED RELEASE ORAL at 11:36

## 2018-01-01 RX ADMIN — DIVALPROEX SODIUM 250 MG: 250 TABLET, FILM COATED, EXTENDED RELEASE ORAL at 19:34

## 2018-01-01 RX ADMIN — Medication 10 ML: at 23:42

## 2018-01-01 RX ADMIN — MIDODRINE HYDROCHLORIDE 10 MG: 5 TABLET ORAL at 11:39

## 2018-01-01 RX ADMIN — MIDODRINE HYDROCHLORIDE 10 MG: 5 TABLET ORAL at 08:21

## 2018-01-01 RX ADMIN — IPRATROPIUM BROMIDE AND ALBUTEROL SULFATE 3 ML: 2.5; .5 SOLUTION RESPIRATORY (INHALATION) at 09:17

## 2018-01-01 RX ADMIN — PANTOPRAZOLE SODIUM 40 MG: 40 TABLET, DELAYED RELEASE ORAL at 10:12

## 2018-01-01 RX ADMIN — Medication 10 ML: at 10:48

## 2018-01-01 RX ADMIN — FOLIC ACID 1 MG: 1 TABLET ORAL at 08:31

## 2018-01-01 RX ADMIN — FUROSEMIDE 20 MG: 10 INJECTION, SOLUTION INTRAVENOUS at 08:43

## 2018-01-01 RX ADMIN — SPIRONOLACTONE 50 MG: 25 TABLET ORAL at 09:24

## 2018-01-01 RX ADMIN — PANTOPRAZOLE SODIUM 40 MG: 40 TABLET, DELAYED RELEASE ORAL at 08:44

## 2018-01-01 RX ADMIN — IPRATROPIUM BROMIDE AND ALBUTEROL SULFATE 3 ML: 2.5; .5 SOLUTION RESPIRATORY (INHALATION) at 12:04

## 2018-01-01 RX ADMIN — INSULIN LISPRO 3 UNITS: 100 INJECTION, SOLUTION INTRAVENOUS; SUBCUTANEOUS at 22:14

## 2018-01-01 RX ADMIN — MIDODRINE HYDROCHLORIDE 10 MG: 5 TABLET ORAL at 23:40

## 2018-01-01 RX ADMIN — Medication 10 ML: at 08:49

## 2018-01-01 RX ADMIN — MOMETASONE FUROATE AND FORMOTEROL FUMARATE DIHYDRATE 2 PUFF: 200; 5 AEROSOL RESPIRATORY (INHALATION) at 08:34

## 2018-01-01 RX ADMIN — INSULIN LISPRO 2 UNITS: 100 INJECTION, SOLUTION INTRAVENOUS; SUBCUTANEOUS at 18:05

## 2018-01-01 RX ADMIN — IPRATROPIUM BROMIDE AND ALBUTEROL SULFATE 3 ML: 2.5; .5 SOLUTION RESPIRATORY (INHALATION) at 12:35

## 2018-01-01 RX ADMIN — SPIRONOLACTONE 50 MG: 25 TABLET ORAL at 10:12

## 2018-01-01 RX ADMIN — DONEPEZIL HYDROCHLORIDE 5 MG: 5 TABLET, FILM COATED ORAL at 21:09

## 2018-01-01 RX ADMIN — POTASSIUM CHLORIDE 20 MEQ: 20 TABLET, EXTENDED RELEASE ORAL at 11:57

## 2018-01-01 RX ADMIN — DIVALPROEX SODIUM 250 MG: 250 TABLET, FILM COATED, EXTENDED RELEASE ORAL at 08:30

## 2018-01-01 RX ADMIN — INSULIN LISPRO 2 UNITS: 100 INJECTION, SOLUTION INTRAVENOUS; SUBCUTANEOUS at 17:10

## 2018-01-01 RX ADMIN — MIDODRINE HYDROCHLORIDE 10 MG: 5 TABLET ORAL at 09:47

## 2018-01-01 RX ADMIN — DONEPEZIL HYDROCHLORIDE 5 MG: 5 TABLET, FILM COATED ORAL at 21:16

## 2018-01-01 RX ADMIN — INSULIN GLARGINE 10 UNITS: 100 INJECTION, SOLUTION SUBCUTANEOUS at 22:14

## 2018-01-01 RX ADMIN — DIVALPROEX SODIUM 250 MG: 250 TABLET, EXTENDED RELEASE ORAL at 10:12

## 2018-01-01 RX ADMIN — MAGNESIUM GLUCONATE 500 MG ORAL TABLET 400 MG: 500 TABLET ORAL at 08:21

## 2018-01-01 RX ADMIN — FOLIC ACID 1 MG: 1 TABLET ORAL at 08:44

## 2018-01-01 RX ADMIN — FOLIC ACID 1 MG: 1 TABLET ORAL at 11:36

## 2018-01-01 RX ADMIN — MIDODRINE HYDROCHLORIDE 10 MG: 5 TABLET ORAL at 21:09

## 2018-01-01 RX ADMIN — DIVALPROEX SODIUM 250 MG: 250 TABLET, EXTENDED RELEASE ORAL at 10:48

## 2018-01-01 RX ADMIN — Medication 10 ML: at 11:42

## 2018-01-01 RX ADMIN — MIDODRINE HYDROCHLORIDE 10 MG: 5 TABLET ORAL at 14:05

## 2018-01-01 RX ADMIN — Medication 10 ML: at 21:10

## 2018-01-01 RX ADMIN — INSULIN GLARGINE 10 UNITS: 100 INJECTION, SOLUTION SUBCUTANEOUS at 23:44

## 2018-01-01 RX ADMIN — CITALOPRAM 20 MG: 20 TABLET, FILM COATED ORAL at 08:31

## 2018-01-01 RX ADMIN — INSULIN LISPRO 2 UNITS: 100 INJECTION, SOLUTION INTRAVENOUS; SUBCUTANEOUS at 21:01

## 2018-01-01 RX ADMIN — INSULIN GLARGINE 10 UNITS: 100 INJECTION, SOLUTION SUBCUTANEOUS at 21:09

## 2018-01-01 RX ADMIN — MAGNESIUM GLUCONATE 500 MG ORAL TABLET 400 MG: 500 TABLET ORAL at 10:12

## 2018-01-01 RX ADMIN — FUROSEMIDE 20 MG: 10 INJECTION, SOLUTION INTRAVENOUS at 18:01

## 2018-01-01 RX ADMIN — CITALOPRAM 20 MG: 20 TABLET, FILM COATED ORAL at 09:24

## 2018-01-01 RX ADMIN — IPRATROPIUM BROMIDE AND ALBUTEROL SULFATE 3 ML: 2.5; .5 SOLUTION RESPIRATORY (INHALATION) at 17:13

## 2018-01-01 RX ADMIN — IPRATROPIUM BROMIDE AND ALBUTEROL SULFATE 3 ML: 2.5; .5 SOLUTION RESPIRATORY (INHALATION) at 12:25

## 2018-01-01 RX ADMIN — INSULIN LISPRO 2 UNITS: 100 INJECTION, SOLUTION INTRAVENOUS; SUBCUTANEOUS at 17:27

## 2018-01-01 RX ADMIN — FLUTICASONE PROPIONATE 2 SPRAY: 50 SPRAY, METERED NASAL at 09:49

## 2018-01-01 RX ADMIN — IPRATROPIUM BROMIDE AND ALBUTEROL SULFATE 3 ML: 2.5; .5 SOLUTION RESPIRATORY (INHALATION) at 16:14

## 2018-01-01 RX ADMIN — MOMETASONE FUROATE AND FORMOTEROL FUMARATE DIHYDRATE 2 PUFF: 200; 5 AEROSOL RESPIRATORY (INHALATION) at 09:25

## 2018-01-01 RX ADMIN — MAGNESIUM GLUCONATE 500 MG ORAL TABLET 400 MG: 500 TABLET ORAL at 09:46

## 2018-01-01 RX ADMIN — DIVALPROEX SODIUM 250 MG: 250 TABLET, FILM COATED, EXTENDED RELEASE ORAL at 17:10

## 2018-01-01 RX ADMIN — DONEPEZIL HYDROCHLORIDE 5 MG: 5 TABLET, FILM COATED ORAL at 22:11

## 2018-01-01 ASSESSMENT — ENCOUNTER SYMPTOMS
VOMITING: 0
DIARRHEA: 0
SHORTNESS OF BREATH: 0
NAUSEA: 0
BACK PAIN: 0
CONSTIPATION: 0
COLOR CHANGE: 0
ABDOMINAL PAIN: 0
COUGH: 0
SORE THROAT: 0

## 2018-01-01 ASSESSMENT — PAIN DESCRIPTION - DESCRIPTORS: DESCRIPTORS: HEAVINESS

## 2018-01-01 ASSESSMENT — PAIN DESCRIPTION - LOCATION: LOCATION: LEG

## 2018-01-01 ASSESSMENT — PAIN SCALES - GENERAL
PAINLEVEL_OUTOF10: 0
PAINLEVEL_OUTOF10: 3
PAINLEVEL_OUTOF10: 0

## 2018-01-01 ASSESSMENT — PAIN DESCRIPTION - FREQUENCY: FREQUENCY: CONTINUOUS

## 2018-01-01 ASSESSMENT — PAIN DESCRIPTION - PAIN TYPE: TYPE: ACUTE PAIN

## 2018-01-01 ASSESSMENT — PAIN DESCRIPTION - ORIENTATION: ORIENTATION: RIGHT

## 2018-01-06 PROBLEM — G93.40 ACUTE ENCEPHALOPATHY: Status: ACTIVE | Noted: 2018-01-06

## 2018-02-02 PROBLEM — G93.40 ACUTE ENCEPHALOPATHY: Status: RESOLVED | Noted: 2018-01-06 | Resolved: 2018-02-02

## 2018-02-02 PROBLEM — K92.0 HEMATEMESIS: Status: ACTIVE | Noted: 2018-02-02

## 2018-02-02 PROBLEM — E44.0 MODERATE PROTEIN-CALORIE MALNUTRITION (HCC): Chronic | Status: ACTIVE | Noted: 2017-10-01

## 2018-02-02 PROBLEM — E87.70 VOLUME OVERLOAD: Status: RESOLVED | Noted: 2017-09-29 | Resolved: 2018-02-02

## 2018-02-02 PROBLEM — R31.9 HEMATURIA: Status: RESOLVED | Noted: 2017-09-29 | Resolved: 2018-02-02

## 2018-03-08 ENCOUNTER — HOSPITAL ENCOUNTER (INPATIENT)
Dept: PEDIATRICS UNIT | Age: 78
LOS: 8 days | Discharge: NURSING, REHAB OR LONG-TERM CARE | DRG: 300 | End: 2018-03-16
Attending: EMERGENCY MEDICINE | Admitting: INTERNAL MEDICINE
Payer: COMMERCIAL

## 2018-03-08 DIAGNOSIS — R31.9 HEMATURIA, UNSPECIFIED TYPE: ICD-10-CM

## 2018-03-08 DIAGNOSIS — R41.82 ALTERED MENTAL STATUS, UNSPECIFIED ALTERED MENTAL STATUS TYPE: Primary | ICD-10-CM

## 2018-03-08 PROBLEM — F02.818 ALZHEIMER'S DEMENTIA WITH BEHAVIORAL DISTURBANCE (HCC): Status: ACTIVE | Noted: 2018-03-08

## 2018-03-08 PROBLEM — K92.0 HEMATEMESIS: Status: RESOLVED | Noted: 2018-02-02 | Resolved: 2018-03-08

## 2018-03-08 PROBLEM — G30.9 ALZHEIMER'S DEMENTIA WITH BEHAVIORAL DISTURBANCE (HCC): Status: ACTIVE | Noted: 2018-03-08

## 2018-03-08 LAB
ALBUMIN SERPL-MCNC: 2.3 G/DL (ref 3.5–5.2)
ALP BLD-CCNC: 69 U/L (ref 40–129)
ALT SERPL-CCNC: 13 U/L (ref 0–40)
AMMONIA: 18 UMOL/L (ref 16–60)
ANION GAP SERPL CALCULATED.3IONS-SCNC: 11 MMOL/L (ref 7–16)
AST SERPL-CCNC: 16 U/L (ref 0–39)
BACTERIA: ABNORMAL /HPF
BILIRUB SERPL-MCNC: 1.1 MG/DL (ref 0–1.2)
BILIRUBIN URINE: ABNORMAL
BLOOD, URINE: ABNORMAL
BUN BLDV-MCNC: 10 MG/DL (ref 8–23)
CALCIUM SERPL-MCNC: 8.4 MG/DL (ref 8.6–10.2)
CHLORIDE BLD-SCNC: 98 MMOL/L (ref 98–107)
CLARITY: CLEAR
CO2: 26 MMOL/L (ref 22–29)
COLOR: ABNORMAL
CREAT SERPL-MCNC: 0.7 MG/DL (ref 0.7–1.2)
GFR AFRICAN AMERICAN: >60
GFR NON-AFRICAN AMERICAN: >60 ML/MIN/1.73
GLUCOSE BLD-MCNC: 242 MG/DL (ref 74–109)
GLUCOSE URINE: 250 MG/DL
HCT VFR BLD CALC: 42.9 % (ref 37–54)
HEMOGLOBIN: 14.3 G/DL (ref 12.5–16.5)
INR BLD: 1.3
KETONES, URINE: ABNORMAL MG/DL
LACTIC ACID: 1.7 MMOL/L (ref 0.5–2.2)
LEUKOCYTE ESTERASE, URINE: ABNORMAL
MCH RBC QN AUTO: 30.2 PG (ref 26–35)
MCHC RBC AUTO-ENTMCNC: 33.3 % (ref 32–34.5)
MCV RBC AUTO: 90.7 FL (ref 80–99.9)
METER GLUCOSE: 190 MG/DL (ref 70–110)
NITRITE, URINE: POSITIVE
PDW BLD-RTO: 14 FL (ref 11.5–15)
PH UA: 5.5 (ref 5–9)
PLATELET # BLD: 170 E9/L (ref 130–450)
PMV BLD AUTO: 10.5 FL (ref 7–12)
POTASSIUM SERPL-SCNC: 3.9 MMOL/L (ref 3.5–5)
PRO-BNP: 531 PG/ML (ref 0–450)
PROTEIN UA: 30 MG/DL
PROTHROMBIN TIME: 13.6 SEC (ref 9.3–12.4)
RBC # BLD: 4.73 E12/L (ref 3.8–5.8)
RBC UA: ABNORMAL /HPF (ref 0–2)
SODIUM BLD-SCNC: 135 MMOL/L (ref 132–146)
SPECIFIC GRAVITY UA: 1.02 (ref 1–1.03)
TOTAL PROTEIN: 5.4 G/DL (ref 6.4–8.3)
TROPONIN: 0.03 NG/ML (ref 0–0.03)
UROBILINOGEN, URINE: 1 E.U./DL
WBC # BLD: 10.3 E9/L (ref 4.5–11.5)
WBC UA: ABNORMAL /HPF (ref 0–5)

## 2018-03-08 PROCEDURE — 83605 ASSAY OF LACTIC ACID: CPT

## 2018-03-08 PROCEDURE — 85027 COMPLETE CBC AUTOMATED: CPT

## 2018-03-08 PROCEDURE — 71045 X-RAY EXAM CHEST 1 VIEW: CPT

## 2018-03-08 PROCEDURE — 70450 CT HEAD/BRAIN W/O DYE: CPT

## 2018-03-08 PROCEDURE — 93971 EXTREMITY STUDY: CPT

## 2018-03-08 PROCEDURE — 80053 COMPREHEN METABOLIC PANEL: CPT

## 2018-03-08 PROCEDURE — 82140 ASSAY OF AMMONIA: CPT

## 2018-03-08 PROCEDURE — 36415 COLL VENOUS BLD VENIPUNCTURE: CPT

## 2018-03-08 PROCEDURE — 85610 PROTHROMBIN TIME: CPT

## 2018-03-08 PROCEDURE — 99285 EMERGENCY DEPT VISIT HI MDM: CPT

## 2018-03-08 PROCEDURE — 87149 DNA/RNA DIRECT PROBE: CPT

## 2018-03-08 PROCEDURE — 81015 MICROSCOPIC EXAM OF URINE: CPT

## 2018-03-08 PROCEDURE — 87186 SC STD MICRODIL/AGAR DIL: CPT

## 2018-03-08 PROCEDURE — 82962 GLUCOSE BLOOD TEST: CPT

## 2018-03-08 PROCEDURE — 9990 CHARGE CONVERSION

## 2018-03-08 PROCEDURE — 84484 ASSAY OF TROPONIN QUANT: CPT

## 2018-03-08 PROCEDURE — 87076 CULTURE ANAEROBE IDENT EACH: CPT

## 2018-03-08 PROCEDURE — 83880 ASSAY OF NATRIURETIC PEPTIDE: CPT

## 2018-03-08 PROCEDURE — 87040 BLOOD CULTURE FOR BACTERIA: CPT

## 2018-03-08 PROCEDURE — 93005 ELECTROCARDIOGRAM TRACING: CPT

## 2018-03-08 PROCEDURE — 81003 URINALYSIS AUTO W/O SCOPE: CPT

## 2018-03-08 RX ORDER — INSULIN GLARGINE 100 [IU]/ML
10 INJECTION, SOLUTION SUBCUTANEOUS 2 TIMES DAILY
Status: DISCONTINUED | OUTPATIENT
Start: 2018-03-08 | End: 2018-03-15

## 2018-03-08 RX ORDER — SPIRONOLACTONE 25 MG/1
50 TABLET ORAL DAILY
Status: DISCONTINUED | OUTPATIENT
Start: 2018-03-09 | End: 2018-03-16 | Stop reason: HOSPADM

## 2018-03-08 RX ORDER — ACETAMINOPHEN 325 MG/1
650 TABLET ORAL EVERY 4 HOURS PRN
Status: DISCONTINUED | OUTPATIENT
Start: 2018-03-08 | End: 2018-03-16 | Stop reason: HOSPADM

## 2018-03-08 RX ORDER — LANOLIN ALCOHOL/MO/W.PET/CERES
400 CREAM (GRAM) TOPICAL DAILY
Status: DISCONTINUED | OUTPATIENT
Start: 2018-03-08 | End: 2018-03-16 | Stop reason: HOSPADM

## 2018-03-08 RX ORDER — FERROUS SULFATE 325(65) MG
325 TABLET ORAL 2 TIMES DAILY WITH MEALS
Status: DISCONTINUED | OUTPATIENT
Start: 2018-03-08 | End: 2018-03-16 | Stop reason: HOSPADM

## 2018-03-08 RX ORDER — DONEPEZIL HYDROCHLORIDE 5 MG/1
5 TABLET, FILM COATED ORAL NIGHTLY
Status: DISCONTINUED | OUTPATIENT
Start: 2018-03-08 | End: 2018-03-14

## 2018-03-08 RX ORDER — SODIUM CHLORIDE 0.9 % (FLUSH) 0.9 %
10 SYRINGE (ML) INJECTION EVERY 12 HOURS SCHEDULED
Status: DISCONTINUED | OUTPATIENT
Start: 2018-03-08 | End: 2018-03-16 | Stop reason: HOSPADM

## 2018-03-08 RX ORDER — CITALOPRAM 20 MG/1
20 TABLET ORAL DAILY
Status: DISCONTINUED | OUTPATIENT
Start: 2018-03-08 | End: 2018-03-16 | Stop reason: HOSPADM

## 2018-03-08 RX ORDER — PANTOPRAZOLE SODIUM 40 MG/1
40 TABLET, DELAYED RELEASE ORAL
Status: DISCONTINUED | OUTPATIENT
Start: 2018-03-09 | End: 2018-03-16 | Stop reason: HOSPADM

## 2018-03-08 RX ORDER — THIAMINE MONONITRATE (VIT B1) 100 MG
100 TABLET ORAL DAILY
Status: DISCONTINUED | OUTPATIENT
Start: 2018-03-09 | End: 2018-03-16 | Stop reason: HOSPADM

## 2018-03-08 RX ORDER — FOLIC ACID 1 MG/1
1 TABLET ORAL DAILY
Status: DISCONTINUED | OUTPATIENT
Start: 2018-03-08 | End: 2018-03-16 | Stop reason: HOSPADM

## 2018-03-08 RX ORDER — IPRATROPIUM BROMIDE AND ALBUTEROL SULFATE 2.5; .5 MG/3ML; MG/3ML
1 SOLUTION RESPIRATORY (INHALATION) 4 TIMES DAILY
Status: DISCONTINUED | OUTPATIENT
Start: 2018-03-08 | End: 2018-03-16 | Stop reason: HOSPADM

## 2018-03-08 RX ORDER — ASPIRIN 81 MG/1
81 TABLET ORAL DAILY
Status: DISCONTINUED | OUTPATIENT
Start: 2018-03-09 | End: 2018-03-16 | Stop reason: HOSPADM

## 2018-03-08 RX ORDER — FUROSEMIDE 20 MG/1
20 TABLET ORAL 2 TIMES DAILY
Status: DISCONTINUED | OUTPATIENT
Start: 2018-03-08 | End: 2018-03-14

## 2018-03-08 RX ORDER — MIDODRINE HYDROCHLORIDE 5 MG/1
10 TABLET ORAL 3 TIMES DAILY
Status: DISCONTINUED | OUTPATIENT
Start: 2018-03-08 | End: 2018-03-16 | Stop reason: HOSPADM

## 2018-03-08 RX ORDER — SODIUM CHLORIDE 0.9 % (FLUSH) 0.9 %
10 SYRINGE (ML) INJECTION PRN
Status: DISCONTINUED | OUTPATIENT
Start: 2018-03-08 | End: 2018-03-16 | Stop reason: HOSPADM

## 2018-03-08 RX ORDER — POTASSIUM CHLORIDE 20 MEQ/1
20 TABLET, EXTENDED RELEASE ORAL EVERY OTHER DAY
Status: DISCONTINUED | OUTPATIENT
Start: 2018-03-09 | End: 2018-03-16 | Stop reason: HOSPADM

## 2018-03-08 RX ORDER — ONDANSETRON 2 MG/ML
4 INJECTION INTRAMUSCULAR; INTRAVENOUS EVERY 6 HOURS PRN
Status: DISCONTINUED | OUTPATIENT
Start: 2018-03-08 | End: 2018-03-16 | Stop reason: HOSPADM

## 2018-03-08 RX ADMIN — FOLIC ACID 1 MG: 1 TABLET ORAL at 21:57

## 2018-03-08 RX ADMIN — INSULIN GLARGINE 10 UNITS: 100 INJECTION, SOLUTION SUBCUTANEOUS at 22:00

## 2018-03-08 RX ADMIN — FUROSEMIDE 20 MG: 20 TABLET ORAL at 21:57

## 2018-03-08 RX ADMIN — Medication 10 ML: at 21:58

## 2018-03-08 RX ADMIN — MOMETASONE FUROATE AND FORMOTEROL FUMARATE DIHYDRATE 2 PUFF: 200; 5 AEROSOL RESPIRATORY (INHALATION) at 21:57

## 2018-03-08 RX ADMIN — MAGNESIUM GLUCONATE 500 MG ORAL TABLET 400 MG: 500 TABLET ORAL at 21:57

## 2018-03-08 RX ADMIN — FERROUS SULFATE TAB 325 MG (65 MG ELEMENTAL FE) 325 MG: 325 (65 FE) TAB at 21:57

## 2018-03-08 RX ADMIN — MIDODRINE HYDROCHLORIDE 10 MG: 5 TABLET ORAL at 21:57

## 2018-03-08 RX ADMIN — CITALOPRAM 20 MG: 20 TABLET, FILM COATED ORAL at 21:57

## 2018-03-08 RX ADMIN — APIXABAN 5 MG: 5 TABLET, FILM COATED ORAL at 21:57

## 2018-03-08 RX ADMIN — DONEPEZIL HYDROCHLORIDE 5 MG: 5 TABLET, FILM COATED ORAL at 21:57

## 2018-03-08 ASSESSMENT — PAIN DESCRIPTION - PAIN TYPE: TYPE: ACUTE PAIN

## 2018-03-08 ASSESSMENT — PAIN DESCRIPTION - LOCATION: LOCATION: ABDOMEN;PENIS

## 2018-03-08 ASSESSMENT — PAIN SCALES - GENERAL: PAINLEVEL_OUTOF10: 9

## 2018-03-08 NOTE — ED PROVIDER NOTES
Regular rate. Regular rhythm. GI:  Abdomen soft, Non tender, Non distended. No rebound, guarding, or rigidity. No pulsatile masses. : indwelling jessica catheter, dark yellow urine with streaks of blood, no clots noted   Musculoskeletal: Moves all extremities x 4. Warm and well perfused, pitting edema noted to right extremity   Integument: Skin warm and dry. No rashes.    Neurologic: GCS 15, no focal deficits, CN II-XII grossly intact    Lab / Imaging Results   (All laboratory and radiology results have been personally reviewed by myself)  Labs:  Results for orders placed or performed during the hospital encounter of 03/08/18   Urinalysis   Result Value Ref Range    Color, UA DKYELLOW Straw/Yellow    Clarity, UA Clear Clear    Glucose, Ur 250 (A) Negative mg/dL    Bilirubin Urine SMALL (A) Negative    Ketones, Urine TRACE (A) Negative mg/dL    Specific Gravity, UA 1.020 1.005 - 1.030    Blood, Urine LARGE (A) Negative    pH, UA 5.5 5.0 - 9.0    Protein, UA 30 (A) Negative mg/dL    Urobilinogen, Urine 1.0 <2.0 E.U./dL    Nitrite, Urine POSITIVE (A) Negative    Leukocyte Esterase, Urine MODERATE (A) Negative   CBC   Result Value Ref Range    WBC 10.3 4.5 - 11.5 E9/L    RBC 4.73 3.80 - 5.80 E12/L    Hemoglobin 14.3 12.5 - 16.5 g/dL    Hematocrit 42.9 37.0 - 54.0 %    MCV 90.7 80.0 - 99.9 fL    MCH 30.2 26.0 - 35.0 pg    MCHC 33.3 32.0 - 34.5 %    RDW 14.0 11.5 - 15.0 fL    Platelets 975 824 - 200 E9/L    MPV 10.5 7.0 - 12.0 fL   Comprehensive Metabolic Panel   Result Value Ref Range    Sodium 135 132 - 146 mmol/L    Potassium 3.9 3.5 - 5.0 mmol/L    Chloride 98 98 - 107 mmol/L    CO2 26 22 - 29 mmol/L    Anion Gap 11 7 - 16 mmol/L    Glucose 242 (H) 74 - 109 mg/dL    BUN 10 8 - 23 mg/dL    CREATININE 0.7 0.7 - 1.2 mg/dL    GFR Non-African American >60 >=60 mL/min/1.73    GFR African American >60     Calcium 8.4 (L) 8.6 - 10.2 mg/dL    Total Protein 5.4 (L) 6.4 - 8.3 g/dL    Alb 2.3 (L) 3.5 - 5.2 g/dL    Total Bilirubin 1.1 0.0 - 1.2 mg/dL    Alkaline Phosphatase 69 40 - 129 U/L    ALT 13 0 - 40 U/L    AST 16 0 - 39 U/L   Troponin   Result Value Ref Range    Troponin 0.03 0.00 - 0.03 ng/mL   Brain Natriuretic Peptide   Result Value Ref Range    Pro- (H) 0 - 450 pg/mL   Ammonia   Result Value Ref Range    Ammonia 18.0 16.0 - 60.0 umol/L   Lactic Acid, Plasma   Result Value Ref Range    Lactic Acid 1.7 0.5 - 2.2 mmol/L   Microscopic Urinalysis   Result Value Ref Range    WBC, UA 5-10 0 - 5 /HPF    RBC, UA PACKED 0 - 2 /HPF    Bacteria, UA RARE (A) /HPF   Protime-INR   Result Value Ref Range    Protime 13.6 (H) 9.3 - 12.4 sec    INR 1.3    EKG 12 Lead   Result Value Ref Range    Ventricular Rate 75 BPM    Atrial Rate 75 BPM    P-R Interval 210 ms    QRS Duration 84 ms    Q-T Interval 370 ms    QTc Calculation (Bazett) 413 ms    P Axis 63 degrees    T Axis 71 degrees     Imaging: All Radiology results interpreted by Radiologist unless otherwise noted. CT Head WO Contrast   Final Result   1. No evidence of intracranial hemorrhage, extra axial collection,   space-occupying mass lesion or mass effect, midline shift, or acute   territorial infarction. If there is strong clinical suspicion for   acute infarct of the brain, then MR imaging of the brain with   diffusion-weighted sequence may be obtained for further evaluation. 2. Chronic involutional changes and mild microvascular ischemic   disease as described above. XR CHEST PORTABLE   Final Result      1. Minor vascular congestion. US DUP LOWER EXTREMITY RIGHT PILAR    (Results Pending)     EKG #1:  Interpreted by emergency department physician unless otherwise noted. Time:  1322    Rate: 75  QT/QTc: 370/413  Rhythm: Sinus with first degree AV block   Interpretation: no acute changes. Comparison: stable as compared to patient's most recent EKG.     ED Course / Medical Decision Making   Medications - No data to display  ED Course

## 2018-03-09 PROBLEM — I82.401 LEG DVT (DEEP VENOUS THROMBOEMBOLISM), ACUTE, RIGHT (HCC): Status: ACTIVE | Noted: 2018-03-09

## 2018-03-09 LAB
ANION GAP SERPL CALCULATED.3IONS-SCNC: 11 MMOL/L (ref 7–16)
BUN BLDV-MCNC: 10 MG/DL (ref 8–23)
CALCIUM SERPL-MCNC: 7.5 MG/DL (ref 8.6–10.2)
CHLORIDE BLD-SCNC: 100 MMOL/L (ref 98–107)
CO2: 24 MMOL/L (ref 22–29)
CREAT SERPL-MCNC: 0.7 MG/DL (ref 0.7–1.2)
GFR AFRICAN AMERICAN: >60
GFR NON-AFRICAN AMERICAN: >60 ML/MIN/1.73
GLUCOSE BLD-MCNC: 232 MG/DL (ref 74–109)
HCT VFR BLD CALC: 36.7 % (ref 37–54)
HEMOGLOBIN: 12.4 G/DL (ref 12.5–16.5)
MAGNESIUM: 1.7 MG/DL (ref 1.6–2.6)
MCH RBC QN AUTO: 30.1 PG (ref 26–35)
MCHC RBC AUTO-ENTMCNC: 33.8 % (ref 32–34.5)
MCV RBC AUTO: 89.1 FL (ref 80–99.9)
METER GLUCOSE: 273 MG/DL (ref 70–110)
METER GLUCOSE: 297 MG/DL (ref 70–110)
PDW BLD-RTO: 14.4 FL (ref 11.5–15)
PLATELET # BLD: 166 E9/L (ref 130–450)
PMV BLD AUTO: 10.9 FL (ref 7–12)
POTASSIUM SERPL-SCNC: 3.5 MMOL/L (ref 3.5–5)
RBC # BLD: 4.12 E12/L (ref 3.8–5.8)
SODIUM BLD-SCNC: 135 MMOL/L (ref 132–146)
WBC # BLD: 7.4 E9/L (ref 4.5–11.5)

## 2018-03-09 PROCEDURE — 97166 OT EVAL MOD COMPLEX 45 MIN: CPT

## 2018-03-09 PROCEDURE — G8987 SELF CARE CURRENT STATUS: HCPCS

## 2018-03-09 PROCEDURE — 36415 COLL VENOUS BLD VENIPUNCTURE: CPT

## 2018-03-09 PROCEDURE — 85027 COMPLETE CBC AUTOMATED: CPT

## 2018-03-09 PROCEDURE — 99222 1ST HOSP IP/OBS MODERATE 55: CPT | Performed by: CLINICAL NURSE SPECIALIST

## 2018-03-09 PROCEDURE — G8978 MOBILITY CURRENT STATUS: HCPCS

## 2018-03-09 PROCEDURE — 97530 THERAPEUTIC ACTIVITIES: CPT

## 2018-03-09 PROCEDURE — 9990 CHARGE CONVERSION

## 2018-03-09 PROCEDURE — G8988 SELF CARE GOAL STATUS: HCPCS

## 2018-03-09 PROCEDURE — G8979 MOBILITY GOAL STATUS: HCPCS

## 2018-03-09 PROCEDURE — 83735 ASSAY OF MAGNESIUM: CPT

## 2018-03-09 PROCEDURE — 94640 AIRWAY INHALATION TREATMENT: CPT

## 2018-03-09 PROCEDURE — 97162 PT EVAL MOD COMPLEX 30 MIN: CPT

## 2018-03-09 PROCEDURE — 80048 BASIC METABOLIC PNL TOTAL CA: CPT

## 2018-03-09 PROCEDURE — 82962 GLUCOSE BLOOD TEST: CPT

## 2018-03-09 PROCEDURE — 97535 SELF CARE MNGMENT TRAINING: CPT

## 2018-03-09 RX ADMIN — DONEPEZIL HYDROCHLORIDE 5 MG: 5 TABLET, FILM COATED ORAL at 21:07

## 2018-03-09 RX ADMIN — FERROUS SULFATE TAB 325 MG (65 MG ELEMENTAL FE) 325 MG: 325 (65 FE) TAB at 09:11

## 2018-03-09 RX ADMIN — IPRATROPIUM BROMIDE AND ALBUTEROL SULFATE 1 AMPULE: 2.5; .5 SOLUTION RESPIRATORY (INHALATION) at 21:17

## 2018-03-09 RX ADMIN — Medication 10 ML: at 21:55

## 2018-03-09 RX ADMIN — APIXABAN 5 MG: 5 TABLET, FILM COATED ORAL at 21:07

## 2018-03-09 RX ADMIN — MOMETASONE FUROATE AND FORMOTEROL FUMARATE DIHYDRATE 2 PUFF: 200; 5 AEROSOL RESPIRATORY (INHALATION) at 23:04

## 2018-03-09 RX ADMIN — MIDODRINE HYDROCHLORIDE 10 MG: 5 TABLET ORAL at 21:07

## 2018-03-09 RX ADMIN — MAGNESIUM GLUCONATE 500 MG ORAL TABLET 400 MG: 500 TABLET ORAL at 09:11

## 2018-03-09 RX ADMIN — PANTOPRAZOLE SODIUM 40 MG: 40 TABLET, DELAYED RELEASE ORAL at 05:55

## 2018-03-09 RX ADMIN — MIDODRINE HYDROCHLORIDE 10 MG: 5 TABLET ORAL at 09:10

## 2018-03-09 RX ADMIN — SPIRONOLACTONE 50 MG: 25 TABLET ORAL at 09:10

## 2018-03-09 RX ADMIN — INSULIN GLARGINE 10 UNITS: 100 INJECTION, SOLUTION SUBCUTANEOUS at 09:11

## 2018-03-09 RX ADMIN — FUROSEMIDE 20 MG: 20 TABLET ORAL at 21:08

## 2018-03-09 RX ADMIN — INSULIN GLARGINE 10 UNITS: 100 INJECTION, SOLUTION SUBCUTANEOUS at 21:09

## 2018-03-09 RX ADMIN — CITALOPRAM 20 MG: 20 TABLET, FILM COATED ORAL at 09:11

## 2018-03-09 RX ADMIN — ASPIRIN 81 MG: 81 TABLET, COATED ORAL at 09:11

## 2018-03-09 RX ADMIN — APIXABAN 5 MG: 5 TABLET, FILM COATED ORAL at 09:11

## 2018-03-09 RX ADMIN — POTASSIUM CHLORIDE 20 MEQ: 1500 TABLET, EXTENDED RELEASE ORAL at 09:10

## 2018-03-09 RX ADMIN — Medication 100 MG: at 09:10

## 2018-03-09 RX ADMIN — Medication 10 ML: at 09:24

## 2018-03-09 RX ADMIN — FUROSEMIDE 20 MG: 20 TABLET ORAL at 09:11

## 2018-03-09 RX ADMIN — IPRATROPIUM BROMIDE AND ALBUTEROL SULFATE 1 AMPULE: 2.5; .5 SOLUTION RESPIRATORY (INHALATION) at 18:04

## 2018-03-09 RX ADMIN — FOLIC ACID 1 MG: 1 TABLET ORAL at 09:11

## 2018-03-09 RX ADMIN — MIDODRINE HYDROCHLORIDE 10 MG: 5 TABLET ORAL at 14:07

## 2018-03-09 RX ADMIN — FERROUS SULFATE TAB 325 MG (65 MG ELEMENTAL FE) 325 MG: 325 (65 FE) TAB at 17:38

## 2018-03-09 ASSESSMENT — PAIN SCALES - GENERAL
PAINLEVEL_OUTOF10: 0
PAINLEVEL_OUTOF10: 0

## 2018-03-09 NOTE — H&P
Unspecified cirrhosis of liver [facility])      Past Surgical History:        Procedure Laterality Date    BRONCHOSCOPY  4/8/15    CHOLECYSTECTOMY      COLONOSCOPY      CYSTOSCOPY  08/16/2016    retrogrades. stent changed.  DENTAL SURGERY      Full mouth extraction.  EYE SURGERY      JUAN C LENSE IMPLANTS    FOOT SURGERY Bilateral     Correction of bone abnormality.     OTHER SURGICAL HISTORY  10/15/15    right ESWL    TONSILLECTOMY      VENA CAVA FILTER PLACEMENT  10/07/2015    Lea Amos         Medications Prior to Admission:    Prescriptions Prior to Admission: furosemide (LASIX) 40 MG tablet, Take 0.5 tablets by mouth 2 times daily  spironolactone (ALDACTONE) 50 MG tablet, Take 1 tablet by mouth daily  insulin glargine (LANTUS) 100 UNIT/ML injection vial, Inject 10 Units into the skin 2 times daily  insulin lispro (HUMALOG) 100 UNIT/ML injection vial, Inject 0-12 Units into the skin 3 times daily (with meals)  potassium chloride (KLOR-CON M) 20 MEQ extended release tablet, Take 1 tablet by mouth every other day  pantoprazole (PROTONIX) 40 MG tablet, Take 40 mg by mouth daily  citalopram (CELEXA) 20 MG tablet, Take 20 mg by mouth daily  acetaminophen (TYLENOL) 325 MG tablet, Take 2 tablets by mouth every 4 hours as needed for Fever (Temp greater than 100.5, for pain score 1-3)  mineral oil-hydrophilic petrolatum (HYDROPHOR) ointment, Apply topically to scrotum coccyx kendra area daily  vitamin B-1 100 MG tablet, Take 1 tablet by mouth daily  magnesium oxide (MAG-OX) 400 (240 MG) MG tablet, Take 1 tablet by mouth daily  apixaban (ELIQUIS) 5 MG TABS tablet, Take 1 tablet by mouth 2 times daily  fluticasone (FLONASE) 50 MCG/ACT nasal spray, 2 sprays by Nasal route daily  aspirin 81 MG EC tablet, Take 81 mg by mouth daily  ferrous sulfate 325 (65 FE) MG tablet, Take 1 tablet by mouth 2 times daily (with meals)  ipratropium-albuterol (DUONEB) 0.5-2.5 (3) MG/3ML SOLN nebulizer solution, Inhale 3 mLs into the rales, or rhonchi  Abdomen: Bowel sounds present, soft, nontender, no masses, no organomegaly, no peritoneal signs  Extremities:  Right leg with marked swelling and p'eau d'orange appearance   Skin:  Warm and dry, no open lesions or rash  Neuro:  Cranial nerves 2-12 intact, no focal deficits  Breast: deferred  Rectal: deferred  Genitalia:  deferred      DATA:     Recent Labs      03/08/18   1414  03/09/18   0829   WBC  10.3  7.4   HGB  14.3  12.4*   PLT  170  166     Recent Labs      03/08/18   1345  03/09/18   0829   NA  135  135   K  3.9  3.5   BUN  10  10   CREATININE  0.7  0.7     Recent Labs      03/08/18   1345  03/08/18   1520   PROT  5.4*   --    INR   --   1.3     Recent Labs      03/08/18   1345   AST  16   ALT  13   ALKPHOS  69   BILITOT  1.1     No results for input(s): BNP in the last 72 hours.   Recent Labs      03/08/18   1345   TROPONINI  0.03       ASSESSMENT:      Principal Problem:    Leg DVT (deep venous thromboembolism), acute, right ([facility])  Active Problems:    Cirrhosis ([facility]    Diabetes mellitus type 2, uncontrolled ([facility]    Essential hypertension, benign    Lymphedema of both lower extremities    History of DVT (deep vein thrombosis)    COPD (chronic obstructive pulmonary disease) ([facility])    Orthostatic hypotension    Moderate protein-calorie malnutrition ([facility])    Alzheimer's dementia with behavioral disturbance    Altered mental status    Palliative care by specialist  Resolved Problems:    S/P IVC filter        PLAN:    Admitted to [facility] for evaluation of AMS and right leg DVT  On ELiquis at home (compliance?)  IVC filter in place   Vascular evaluation for possible thrombolysis given extensive nature of dvt     Resume home meds  Needs ecf at discharge d/t recurrent hospitalizations   Pt/ot  Discharge plan     Stop rocephin - no evidence of infection     Ellsworth Denver, MD  3/9/2018  3:47 PM

## 2018-03-09 NOTE — PROGRESS NOTES
Palliative Medicine LSW    Whom interviewed:   Pt seen at bedsidehe is alert an dorienetd x 4 at this time. He states his son may contacted, son Deborah Wilburn, states pts mentation changes and  he was agitated last evening . Contact/ Relation: son Deborah Wilburn [phone], daughter Vesna Lima [phone]  Marital Status:    Status: unknown  DPOA-HC/ Relation :  yes,  naming both his son and daughter. Requested copy Living will:     Guardian:    no  Primary Language:   English       Living Situation:   Pt resides alone,  his son as well as his daughter check on pt daily. He is active with [employer] and has [facility] aides through [employer] through [employer]. He had been home from rehab 2 weeks. Son feels pt may have come home from rehab too soon and would like him to go back to [facility] if possible  Support System:   son and daughter  Education/Employment: retired     Financial/Insurance Resources: [address]    Psychosocial:  Pt alert, and pleasant. today He enjoys joking during conversation. Behavioral Health Issues:  pt denies history or behavioral health needs    Substance Use/ Abuse   Tobacco:   former  Alcohol:   denies  Illicit Drug:   denies  Treatment History:  denies      Barriers/Needs:   Language:  no  Learning Deficit:  no   Homelessness:   no  Lacks Finances:  no   Uninsured/Underinsured:  no  Lacks Transportation:  no    Past/Present Community Resources [id number]    Assessment   Pt seen at bedside for Palliative Medicine Psychosocial support while hospitalized for altered mental status, hematuria. His son brought him in also reporting that pt was hallucinating at home, seeing and talking with people who were not there. He had been hospitalized last month for anemia, GI bleed, and an AVM - ablation. He was sent to [facility] for  rehab and has been home for 2 weeks.   He has a history of hx of COPD, DM II, HTN, hx DVT s/p IVC filter and on eliquis, cirrhosis, chronic diastolic CHF. LSW met at bedside with pt, he states his son may contacted. LSW spoke with son Brandyn Bustos, requested copy of HCPOA. Son is interested in rehab again for pt, son to speak with unit SW regarding this.

## 2018-03-09 NOTE — PROGRESS NOTES
deficits  · Assistance/Modification: Min/mod assistance or modifications required to perform tasks. May have comorbidities that affect occupational performance.     Electronically signed by Wen Garcia OT on 3/9/2018 at 11:20 AM    Wen Garcia OTR/L#[id number]

## 2018-03-09 NOTE — CONSULTS
decreased anti-thrombiin-3 level.  Hypertension     Lymphedema of both lower extremities 5/28/2016    Mitral valve insufficiency     Neurogenic bladder     Renal calculus     S/P IVC filter 5/28/2016    Thoracic aortic aneurysm (HCC)     Unspecified cirrhosis of liver [facility])        Past Surgical History:      Past Surgical History:   Procedure Laterality Date    BRONCHOSCOPY  4/8/15    CHOLECYSTECTOMY      COLONOSCOPY      CYSTOSCOPY  08/16/2016    retrogrades. stent changed.  DENTAL SURGERY      Full mouth extraction.  EYE SURGERY      JUAN C LENSE IMPLANTS    FOOT SURGERY Bilateral     Correction of bone abnormality.     OTHER SURGICAL HISTORY  10/15/15    right ESWL    TONSILLECTOMY      VENA CAVA FILTER PLACEMENT  10/07/2015    Lea Amos       Current Medications:     apixaban  5 mg Oral BID    aspirin  81 mg Oral Daily    citalopram  20 mg Oral Daily    donepezil  5 mg Oral Nightly    ferrous sulfate  325 mg Oral BID WC    folic acid  1 mg Oral Daily    furosemide  20 mg Oral BID    insulin glargine  10 Units Subcutaneous BID    magnesium oxide  400 mg Oral Daily    midodrine  10 mg Oral TID    mometasone-formoterol  2 puff Inhalation BID    pantoprazole  40 mg Oral QAM AC    potassium chloride  20 mEq Oral Every Other Day    spironolactone  50 mg Oral Daily    thiamine  100 mg Oral Daily    sodium chloride flush  10 mL Intravenous 2 times per day    ipratropium-albuterol  1 ampule Inhalation 4x daily    cefTRIAXone (ROCEPHIN) IV  1 g Intravenous Q24H       PRN Medications:  acetaminophen, sodium chloride flush, ondansetron    IV Medications:      I&O  I/O this shift:  In: 180 [P.O.:180]  Out: -     Intake/Output Summary (Last 24 hours) at 03/09/18 1105  Last data filed at 03/09/18 0910   Gross per 24 hour   Intake              180 ml   Output              925 ml   Net             -745 ml       Allergies:  Sulfa antibiotics    Social History:      Social History Doppler   interrogation. The right common femoral vein demonstrates normal compressibility and   normal flow on color and spectral doppler interrogation.                Impression   Fairly extensive right lower extremity DVT, as detailed   above           IMPRESSION:   Patient Active Problem List   Diagnosis    Cirrhosis ([facility]    Diabetes mellitus type 2, uncontrolled ([facility]    Essential hypertension, benign    Lymphedema of both lower extremities    History of DVT (deep vein thrombosis)    COPD (chronic obstructive pulmonary disease) (HCC)    Orthostatic hypotension    Moderate protein-calorie malnutrition (HCC)    Alzheimer's dementia with behavioral disturbance         Assessment:/Plan:    64-year-old male with history of COPD, DM, HTN, DVT s/p IVC filter and on eliquis, cirrhosis, HF and dementia who presented with altered mental status and hematuria. Fourth hospital admission in the past 6 months. Positive DVT RLE; on eliquis  Full code; patient would like more discussion with son. PM will follow up. Transition to [facility] upon discharge. Thank you for allowing us to participate in the care of this patient. Margo Dose APRN-CNS, ACHPN    Time in minutes: 50    More than 50% of this interaction was related to counseling or information given regarding goals of care, code status and symptom management.

## 2018-03-10 PROBLEM — I87.2 VENOUS INSUFFICIENCY OF BOTH LOWER EXTREMITIES: Chronic | Status: ACTIVE | Noted: 2018-03-10

## 2018-03-10 PROBLEM — I87.001 POST-THROMBOTIC SYNDROME OF RIGHT LOWER EXTREMITY: Chronic | Status: ACTIVE | Noted: 2018-03-10

## 2018-03-10 PROBLEM — I82.511 CHRONIC DEEP VEIN THROMBOSIS (DVT) OF FEMORAL VEIN OF RIGHT LOWER EXTREMITY (HCC): Chronic | Status: ACTIVE | Noted: 2018-03-09

## 2018-03-10 LAB
ANION GAP SERPL CALCULATED.3IONS-SCNC: 16 MMOL/L (ref 7–16)
BASOPHILS ABSOLUTE: 0.05 E9/L (ref 0–0.2)
BASOPHILS RELATIVE PERCENT: 0.9 % (ref 0–2)
BUN BLDV-MCNC: 9 MG/DL (ref 8–23)
CALCIUM SERPL-MCNC: 7.6 MG/DL (ref 8.6–10.2)
CHLORIDE BLD-SCNC: 105 MMOL/L (ref 98–107)
CO2: 23 MMOL/L (ref 22–29)
CREAT SERPL-MCNC: 0.7 MG/DL (ref 0.7–1.2)
EOSINOPHILS ABSOLUTE: 0.13 E9/L (ref 0.05–0.5)
EOSINOPHILS RELATIVE PERCENT: 2.6 % (ref 0–6)
GFR AFRICAN AMERICAN: >60
GFR NON-AFRICAN AMERICAN: >60 ML/MIN/1.73
GLUCOSE BLD-MCNC: 134 MG/DL (ref 74–109)
HCT VFR BLD CALC: 37.7 % (ref 37–54)
HEMOGLOBIN: 12.9 G/DL (ref 12.5–16.5)
LYMPHOCYTES ABSOLUTE: 0.2 E9/L (ref 1.5–4)
LYMPHOCYTES RELATIVE PERCENT: 4.3 % (ref 20–42)
MCH RBC QN AUTO: 30.6 PG (ref 26–35)
MCHC RBC AUTO-ENTMCNC: 34.2 % (ref 32–34.5)
MCV RBC AUTO: 89.5 FL (ref 80–99.9)
MONOCYTES ABSOLUTE: 0.5 E9/L (ref 0.1–0.95)
MONOCYTES RELATIVE PERCENT: 10.4 % (ref 2–12)
NEUTROPHILS ABSOLUTE: 4.1 E9/L (ref 1.8–7.3)
NEUTROPHILS RELATIVE PERCENT: 81.7 % (ref 43–80)
OVALOCYTES: ABNORMAL
PDW BLD-RTO: 14.6 FL (ref 11.5–15)
PLATELET # BLD: 167 E9/L (ref 130–450)
PMV BLD AUTO: 11.7 FL (ref 7–12)
POIKILOCYTES: ABNORMAL
POLYCHROMASIA: ABNORMAL
POTASSIUM SERPL-SCNC: 3.9 MMOL/L (ref 3.5–5)
RBC # BLD: 4.21 E12/L (ref 3.8–5.8)
SODIUM BLD-SCNC: 144 MMOL/L (ref 132–146)
WBC # BLD: 5 E9/L (ref 4.5–11.5)

## 2018-03-10 PROCEDURE — 2060000000 HC ICU INTERMEDIATE R&B

## 2018-03-10 PROCEDURE — 94640 AIRWAY INHALATION TREATMENT: CPT

## 2018-03-10 PROCEDURE — 99222 1ST HOSP IP/OBS MODERATE 55: CPT | Performed by: SURGERY

## 2018-03-10 PROCEDURE — 2580000003 HC RX 258: Performed by: INTERNAL MEDICINE

## 2018-03-10 PROCEDURE — 6370000000 HC RX 637 (ALT 250 FOR IP): Performed by: INTERNAL MEDICINE

## 2018-03-10 RX ORDER — DEXTROSE MONOHYDRATE 50 MG/ML
100 INJECTION, SOLUTION INTRAVENOUS PRN
Status: CANCELLED | OUTPATIENT
Start: 2018-03-10

## 2018-03-10 RX ORDER — NICOTINE POLACRILEX 4 MG
15 LOZENGE BUCCAL PRN
Status: CANCELLED | OUTPATIENT
Start: 2018-03-10

## 2018-03-10 RX ORDER — DEXTROSE MONOHYDRATE 25 G/50ML
12.5 INJECTION, SOLUTION INTRAVENOUS PRN
Status: CANCELLED | OUTPATIENT
Start: 2018-03-10

## 2018-03-10 RX ADMIN — APIXABAN 5 MG: 5 TABLET, FILM COATED ORAL at 20:59

## 2018-03-10 RX ADMIN — MIDODRINE HYDROCHLORIDE 10 MG: 5 TABLET ORAL at 08:19

## 2018-03-10 RX ADMIN — INSULIN GLARGINE 10 UNITS: 100 INJECTION, SOLUTION SUBCUTANEOUS at 21:03

## 2018-03-10 RX ADMIN — FOLIC ACID 1 MG: 1 TABLET ORAL at 08:19

## 2018-03-10 RX ADMIN — ACETAMINOPHEN 650 MG: 325 TABLET, FILM COATED ORAL at 15:04

## 2018-03-10 RX ADMIN — MIDODRINE HYDROCHLORIDE 10 MG: 5 TABLET ORAL at 20:59

## 2018-03-10 RX ADMIN — MOMETASONE FUROATE AND FORMOTEROL FUMARATE DIHYDRATE 2 PUFF: 200; 5 AEROSOL RESPIRATORY (INHALATION) at 08:19

## 2018-03-10 RX ADMIN — Medication 10 ML: at 21:00

## 2018-03-10 RX ADMIN — Medication 100 MG: at 08:19

## 2018-03-10 RX ADMIN — MAGNESIUM GLUCONATE 500 MG ORAL TABLET 400 MG: 500 TABLET ORAL at 08:19

## 2018-03-10 RX ADMIN — DONEPEZIL HYDROCHLORIDE 5 MG: 5 TABLET, FILM COATED ORAL at 20:59

## 2018-03-10 RX ADMIN — CITALOPRAM 20 MG: 20 TABLET, FILM COATED ORAL at 08:19

## 2018-03-10 RX ADMIN — APIXABAN 5 MG: 5 TABLET, FILM COATED ORAL at 08:18

## 2018-03-10 RX ADMIN — ASPIRIN 81 MG: 81 TABLET, COATED ORAL at 08:19

## 2018-03-10 RX ADMIN — MIDODRINE HYDROCHLORIDE 10 MG: 5 TABLET ORAL at 15:00

## 2018-03-10 RX ADMIN — FERROUS SULFATE TAB 325 MG (65 MG ELEMENTAL FE) 325 MG: 325 (65 FE) TAB at 17:04

## 2018-03-10 RX ADMIN — PANTOPRAZOLE SODIUM 40 MG: 40 TABLET, DELAYED RELEASE ORAL at 06:39

## 2018-03-10 RX ADMIN — FUROSEMIDE 20 MG: 20 TABLET ORAL at 20:59

## 2018-03-10 RX ADMIN — MOMETASONE FUROATE AND FORMOTEROL FUMARATE DIHYDRATE 2 PUFF: 200; 5 AEROSOL RESPIRATORY (INHALATION) at 21:00

## 2018-03-10 RX ADMIN — IPRATROPIUM BROMIDE AND ALBUTEROL SULFATE 1 AMPULE: 2.5; .5 SOLUTION RESPIRATORY (INHALATION) at 15:56

## 2018-03-10 RX ADMIN — FERROUS SULFATE TAB 325 MG (65 MG ELEMENTAL FE) 325 MG: 325 (65 FE) TAB at 08:19

## 2018-03-10 RX ADMIN — FUROSEMIDE 20 MG: 20 TABLET ORAL at 08:19

## 2018-03-10 RX ADMIN — INSULIN GLARGINE 10 UNITS: 100 INJECTION, SOLUTION SUBCUTANEOUS at 08:19

## 2018-03-10 RX ADMIN — SPIRONOLACTONE 50 MG: 25 TABLET ORAL at 08:19

## 2018-03-10 RX ADMIN — Medication 10 ML: at 08:19

## 2018-03-10 RX ADMIN — IPRATROPIUM BROMIDE AND ALBUTEROL SULFATE 1 AMPULE: 2.5; .5 SOLUTION RESPIRATORY (INHALATION) at 09:38

## 2018-03-10 ASSESSMENT — PAIN SCALES - GENERAL
PAINLEVEL_OUTOF10: 3
PAINLEVEL_OUTOF10: 0
PAINLEVEL_OUTOF10: 0
PAINLEVEL_OUTOF10: 10
PAINLEVEL_OUTOF10: 0

## 2018-03-10 ASSESSMENT — PAIN DESCRIPTION - LOCATION
LOCATION: LEG
LOCATION: LEG

## 2018-03-10 ASSESSMENT — PAIN DESCRIPTION - PROGRESSION: CLINICAL_PROGRESSION: NOT CHANGED

## 2018-03-10 ASSESSMENT — PAIN DESCRIPTION - ONSET
ONSET: GRADUAL
ONSET: ON-GOING

## 2018-03-10 ASSESSMENT — PAIN DESCRIPTION - DESCRIPTORS
DESCRIPTORS: ACHING;SHARP;STABBING
DESCRIPTORS: ACHING;DISCOMFORT;SORE

## 2018-03-10 ASSESSMENT — PAIN DESCRIPTION - ORIENTATION
ORIENTATION: RIGHT;DISTAL
ORIENTATION: RIGHT;LEFT

## 2018-03-10 ASSESSMENT — PAIN DESCRIPTION - FREQUENCY
FREQUENCY: CONTINUOUS
FREQUENCY: INTERMITTENT

## 2018-03-10 ASSESSMENT — PAIN DESCRIPTION - PAIN TYPE
TYPE: CHRONIC PAIN
TYPE: ACUTE PAIN

## 2018-03-10 NOTE — CONSULTS
Neurologic   [] Wears Glasses/Contacts [] Stroke/TIA   [] Vision Changes [] Focal weakness   Respiratory [] Slurred Speech    [x] Shortness of breath ENT   Cardiovascular [] Difficulty swallowing   [] Chest Pain Endocrine    [x] Shortness of breath with exertion [] Increased Thirst   Gastrointestinal    [] Abdominal Pain    [] Melena   [] Hematochezia         Past Medical History:   Diagnosis Date    Anemia     Arthritis     CHF (congestive heart failure) (HCC)     Chronic back pain     COPD (chronic obstructive pulmonary disease) ([facility]     Deep vein thrombosis ([facility] 4/2001    Right popliteal/right femoral vein. Patient is on chronic Coumadin therapy. 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 Patient had been evaluated by Dr. Anayeli Foster and was advised knee-high compression stockings.  Degenerative joint disease     Both knees.  Diabetes mellitus ([facility]     GERD (gastroesophageal reflux disease)     H/O chronic prostatitis     S/P prostate biopsy in 12/2001.  [facility]coagulable state ([facility]     Patient is homozygote for IBAN-1 and MTHFR with decreased anti-thrombiin-3 level.  Hypertension     Lymphedema of both lower extremities 5/28/2016    Mitral valve insufficiency     Neurogenic bladder     Renal calculus     S/P IVC filter 5/28/2016    Thoracic aortic aneurysm (HCC)     Unspecified cirrhosis of liver [facility])         Past Surgical History:   Procedure Laterality Date    BRONCHOSCOPY  4/8/15    CHOLECYSTECTOMY      COLONOSCOPY      CYSTOSCOPY  08/16/2016    retrogrades. stent changed.  DENTAL SURGERY      Full mouth extraction.  EYE SURGERY      JUAN C LENSE IMPLANTS    FOOT SURGERY Bilateral     Correction of bone abnormality.     OTHER SURGICAL HISTORY  10/15/15    right ESWL    TONSILLECTOMY      VENA CAVA FILTER PLACEMENT  10/07/2015    Lea Amos     Current Medications:      acetaminophen, sodium chloride flush, not palpable  Lymphatics : Cervical lymphadenopathy not noted     Femoral lymphadenopathy not noted  SKIN:   Normal skin color   Texture and turgor normal, no induration  EXTREMITIES:   R UE 5/5 strength   No cyanosis noted in nail beds  L UE 5/5 strength   No cyanosis noted in nail beds  R LE Edema present, significant   Venous stasis changes   Open wounds absent   L LE Edema present   Open wound absent   Venous stasis changes  R femoral 2+ L femoral 2+   R posterior tibial biphasic L posterior tibial biphasic   R dorsalis pedis biphasic L dorsalis pedis biphasic     LABS:    Lab Results   Component Value Date    WBC 5.0 03/10/2018    HGB 12.9 03/10/2018    HCT 37.7 03/10/2018     03/10/2018    PROTIME 13.6 (H) 03/08/2018    INR 1.3 03/08/2018    K 3.9 03/10/2018    BUN 9 03/10/2018    CREATININE 0.7 03/10/2018     Radiology pertinent to vascular surgery evaluation reviewed  R LE Venous US  · + DVT R LE  · The superficial femoral, popliteal and tibioperoneal trunk have non occlusive thrombus    9/28/17 R LE Venous US  · Chronic nonocclusive thrombus distal superficial femoral, popliteal and tibioperoneal trunk    Assesment/Plan  Hx of IVC Filter  R LE DVT  DVT appears to be stable as compared to previous imaging done 9/2017  No intervention indicated  Eliquis per primary  Pt already has filter in place  Elevate Bilateral LE while in bed or sitting  Tubigrip  Bilateral LE while in the hospital  Compression stockings - pt already has some, but will refuse to wear them as it causes him soreness  F/U as outpatient as needed  Call if patient develops worsening of swelling or wounds  discussed with patient pathophysiology of DVT, PE, and thrombophlebitis   All ?s answered    R LE swelling, edema  Post THrombotic syndrome  · Ideally he would be wearing prescription grade stockings but it sounds unlikely based off discussion with son that pt would tolerate or be willing to do so  · IN addition his liver cirrhosis,

## 2018-03-10 NOTE — PROGRESS NOTES
XR CHEST PORTABLE NUMBER OF IMAGES:  1 INDICATION:  ams  COMPARISON: 2018 FINDINGS:  Normal heart size. Atherosclerosis. No focal consolidation, pneumothorax or pleural effusion. Vascular congestion. No acute osseous changes. Degenerative changes of the spine. 1. Minor vascular congestion. Us Dup Lower Extremity Right Pilar    Result Date: 3/8/2018  Patient MRN:  [id number] : 1940 Age: 76 years Gender: Male Order Date:  3/8/2018 5:15 PM EXAM: US DUP LOWER EXTREMITY RIGHT PILAR NUMBER OF IMAGES:  32 INDICATION: Rule Out DVT Technique: Gray-scale ultrasound with color and spectral Doppler supplementation of the lower extremity veins. Findings: The right femoral and popliteal veins and the tibioperoneal trunk demonstrate noncompressibility and diminished flow on color Doppler interrogation. The right common femoral vein demonstrates normal compressibility and normal flow on color and spectral doppler interrogation. Fairly extensive right lower extremity DVT, as detailed above. ALERT:  THIS IS AN ABNORMAL REPORT      Assessment:    Patient Active Problem List   Diagnosis    Cirrhosis ([address]    Diabetes mellitus type 2, uncontrolled ([address]    Essential hypertension, benign    Lymphedema of both lower extremities    History of DVT (deep vein thrombosis)    COPD (chronic obstructive pulmonary disease) (HCC)    Orthostatic hypotension    Moderate protein-calorie malnutrition ([address]    Alzheimer's dementia with behavioral disturbance    Altered mental status    Palliative care by specialist    Leg DVT (deep venous thromboembolism), acute, right [facility])       Plan:    Wants urology consulted regarding his cathater  Await vascular eval  Has ivc filter  ?thrombectomy          Leonardo Pelayo  8:51 AM  3/10/2018    NOTE: This report was transcribed using voice recognition software.  Every effort was made to ensure accuracy; however, inadvertent transcription errors may be present

## 2018-03-10 NOTE — CONSULTS
Inpatient consult to Urology  Consult performed by: Min Bardales ordered by: Kamron Martinez  Assessment/Recommendations: INPATIENT CONSULTATION RECORD FOR  3/10/2018      [facility]  [address] [place] [address]. Sushant Wang, [address] [facility]  [phone]        REASON FOR CONSULTATION:      Jessica management, retention    HISTORY OF PRESENT ILLNESS:      The patient is a 76 y.o. male patient who is well known to Dr. Anurag West. Neo Chand. He has a history of neurogenic bladder with a chronic jessica catheter. He has had a previous TURP in 2006. He was admitted for mental status changes. He has multiple medical problems including CHF, lymphedema, hypoalbuminemia, renal calculi, and DVT. He resides at a nursing facility. He is requesting that his catheter be removed. He has been recommended an s/p tube in the past.       Past Medical History:  No date: Anemia  No date: Arthritis  No date: CHF (congestive heart failure) ([facility])  No date: Chronic back pain  No date: COPD (chronic obstructive pulmonary disease) (*  4/2001: Deep vein thrombosis (HCC)      Comment: Right popliteal/right femoral vein. Patient                is on chronic Coumadin therapy. Patient also                had post-phlebitic syndrome and chronic venous                insufficiency with incompetent valves. Patient               had been evaluated by Dr. Marivel Cisse and was                advised knee-high compression stockings. No date: Degenerative joint disease      Comment: Both knees. No date: Diabetes mellitus (HCC)  No date: GERD (gastroesophageal reflux disease)  No date: H/O chronic prostatitis      Comment: S/P prostate biopsy in 12/2001. No date: Hypercoagulable state [facility])      Comment: Patient is homozygote for IBAN-1 and MTHFR with               decreased anti-thrombiin-3 level.   No date: Hypertension  5/28/2016: Lymphedema of both lower extremities  No date: Mitral valve insufficiency  No date: Neurogenic

## 2018-03-11 PROCEDURE — 2060000000 HC ICU INTERMEDIATE R&B

## 2018-03-11 PROCEDURE — 2580000003 HC RX 258: Performed by: INTERNAL MEDICINE

## 2018-03-11 PROCEDURE — 6370000000 HC RX 637 (ALT 250 FOR IP): Performed by: INTERNAL MEDICINE

## 2018-03-11 PROCEDURE — 94640 AIRWAY INHALATION TREATMENT: CPT

## 2018-03-11 RX ADMIN — APIXABAN 5 MG: 5 TABLET, FILM COATED ORAL at 08:02

## 2018-03-11 RX ADMIN — POTASSIUM CHLORIDE 20 MEQ: 1500 TABLET, EXTENDED RELEASE ORAL at 08:02

## 2018-03-11 RX ADMIN — ASPIRIN 81 MG: 81 TABLET, COATED ORAL at 08:02

## 2018-03-11 RX ADMIN — MIDODRINE HYDROCHLORIDE 10 MG: 5 TABLET ORAL at 20:38

## 2018-03-11 RX ADMIN — MIDODRINE HYDROCHLORIDE 10 MG: 5 TABLET ORAL at 13:27

## 2018-03-11 RX ADMIN — CITALOPRAM 20 MG: 20 TABLET, FILM COATED ORAL at 08:02

## 2018-03-11 RX ADMIN — ACETAMINOPHEN 650 MG: 325 TABLET, FILM COATED ORAL at 20:47

## 2018-03-11 RX ADMIN — PANTOPRAZOLE SODIUM 40 MG: 40 TABLET, DELAYED RELEASE ORAL at 06:11

## 2018-03-11 RX ADMIN — Medication 10 ML: at 20:38

## 2018-03-11 RX ADMIN — MOMETASONE FUROATE AND FORMOTEROL FUMARATE DIHYDRATE 2 PUFF: 200; 5 AEROSOL RESPIRATORY (INHALATION) at 08:02

## 2018-03-11 RX ADMIN — FERROUS SULFATE TAB 325 MG (65 MG ELEMENTAL FE) 325 MG: 325 (65 FE) TAB at 17:06

## 2018-03-11 RX ADMIN — INSULIN GLARGINE 10 UNITS: 100 INJECTION, SOLUTION SUBCUTANEOUS at 08:01

## 2018-03-11 RX ADMIN — APIXABAN 5 MG: 5 TABLET, FILM COATED ORAL at 20:38

## 2018-03-11 RX ADMIN — IPRATROPIUM BROMIDE AND ALBUTEROL SULFATE 1 AMPULE: 2.5; .5 SOLUTION RESPIRATORY (INHALATION) at 09:59

## 2018-03-11 RX ADMIN — FUROSEMIDE 20 MG: 20 TABLET ORAL at 20:38

## 2018-03-11 RX ADMIN — FOLIC ACID 1 MG: 1 TABLET ORAL at 08:02

## 2018-03-11 RX ADMIN — FERROUS SULFATE TAB 325 MG (65 MG ELEMENTAL FE) 325 MG: 325 (65 FE) TAB at 08:02

## 2018-03-11 RX ADMIN — MOMETASONE FUROATE AND FORMOTEROL FUMARATE DIHYDRATE 2 PUFF: 200; 5 AEROSOL RESPIRATORY (INHALATION) at 20:38

## 2018-03-11 RX ADMIN — SPIRONOLACTONE 50 MG: 25 TABLET ORAL at 08:02

## 2018-03-11 RX ADMIN — MIDODRINE HYDROCHLORIDE 10 MG: 5 TABLET ORAL at 08:02

## 2018-03-11 RX ADMIN — DONEPEZIL HYDROCHLORIDE 5 MG: 5 TABLET, FILM COATED ORAL at 20:38

## 2018-03-11 RX ADMIN — Medication 10 ML: at 08:02

## 2018-03-11 RX ADMIN — IPRATROPIUM BROMIDE AND ALBUTEROL SULFATE 1 AMPULE: 2.5; .5 SOLUTION RESPIRATORY (INHALATION) at 14:08

## 2018-03-11 RX ADMIN — Medication 100 MG: at 08:02

## 2018-03-11 RX ADMIN — IPRATROPIUM BROMIDE AND ALBUTEROL SULFATE 1 AMPULE: 2.5; .5 SOLUTION RESPIRATORY (INHALATION) at 19:53

## 2018-03-11 RX ADMIN — INSULIN GLARGINE 10 UNITS: 100 INJECTION, SOLUTION SUBCUTANEOUS at 20:40

## 2018-03-11 RX ADMIN — MAGNESIUM GLUCONATE 500 MG ORAL TABLET 400 MG: 500 TABLET ORAL at 08:02

## 2018-03-11 RX ADMIN — FUROSEMIDE 20 MG: 20 TABLET ORAL at 08:02

## 2018-03-11 ASSESSMENT — PAIN SCALES - GENERAL
PAINLEVEL_OUTOF10: 0
PAINLEVEL_OUTOF10: 5
PAINLEVEL_OUTOF10: 0
PAINLEVEL_OUTOF10: 5
PAINLEVEL_OUTOF10: 0

## 2018-03-11 NOTE — PROGRESS NOTES
Clinical nurse manager on unit, reminded that patient still need tubi .  Stated she will bring them up

## 2018-03-11 NOTE — PROGRESS NOTES
Subjective:    Chief complaint:    Except for right leg pain he is feeling okay today    Objective:    BP (!) 91/56   Pulse 91   Temp 98.2 °F (36.8 °C) (Oral)   Resp 18   Ht 5' 10\" (1.778 m)   Wt 193 lb 3 oz (87.6 kg)   SpO2 95%   BMI 27.72 kg/m²   General : Awake ,alert,no distress. Heart:  RRR, no murmurs, gallops, or rubs. Lungs:  CTA bilaterally, no wheeze, rales or rhonchi  Abd: bowel sounds present, nontender, nondistended, no masses  Extrem:  No clubbing, cyanosis, right leg 1+ edema  Granados draining clear urine    CBC:   Lab Results   Component Value Date    WBC 5.0 03/10/2018    RBC 4.21 03/10/2018    HGB 12.9 03/10/2018    HCT 37.7 03/10/2018    MCV 89.5 03/10/2018    MCH 30.6 03/10/2018    MCHC 34.2 03/10/2018    RDW 14.6 03/10/2018     03/10/2018    MPV 11.7 03/10/2018     BMP:    Lab Results   Component Value Date     03/10/2018    K 3.9 03/10/2018     03/10/2018    CO2 23 03/10/2018    BUN 9 03/10/2018    LABALBU 2.3 2018    CREATININE 0.7 03/10/2018    CALCIUM 7.6 03/10/2018    GFRAA >60 03/10/2018    LABGLOM >60 03/10/2018    GLUCOSE 134 03/10/2018    GLUCOSE 319 2011     PT/INR:    Lab Results   Component Value Date    PROTIME 13.6 2018    PROTIME 16.5 2011    INR 1.3 2018     Troponin:    Lab Results   Component Value Date    TROPONINI 0.03 2018       No results for input(s): LABURIN in the last 72 hours.   Recent Labs      18   1330   BC  24 Hours- no growth     Recent Labs      18   1345   BLOODCULT2  24 Hours- no growth           Ct Head Wo Contrast    Result Date: 3/8/2018  Patient MRN:  [id number] : 1940 Age: 76 years Gender: Male Order Date:  3/8/2018 2:34 PM EXAM: CT HEAD WO CONTRAST NUMBER OF IMAGES:  111 INDICATION: Altered mental status Technique: Multiple contiguous 5 mm axial images were obtained from the skull base to the vertex without administration of IV contrast. Reformatted images were generated in the recognition software.  Every effort was made to ensure accuracy; however, inadvertent transcription errors may be present

## 2018-03-12 LAB
METER GLUCOSE: 140 MG/DL (ref 70–110)
METER GLUCOSE: 239 MG/DL (ref 70–110)
METER GLUCOSE: 270 MG/DL (ref 70–110)

## 2018-03-12 PROCEDURE — 6370000000 HC RX 637 (ALT 250 FOR IP): Performed by: INTERNAL MEDICINE

## 2018-03-12 PROCEDURE — 2060000000 HC ICU INTERMEDIATE R&B

## 2018-03-12 PROCEDURE — 82962 GLUCOSE BLOOD TEST: CPT

## 2018-03-12 PROCEDURE — 2580000003 HC RX 258: Performed by: INTERNAL MEDICINE

## 2018-03-12 PROCEDURE — 94640 AIRWAY INHALATION TREATMENT: CPT

## 2018-03-12 RX ORDER — NICOTINE POLACRILEX 4 MG
15 LOZENGE BUCCAL PRN
Status: DISCONTINUED | OUTPATIENT
Start: 2018-03-12 | End: 2018-03-16 | Stop reason: HOSPADM

## 2018-03-12 RX ORDER — DEXTROSE MONOHYDRATE 25 G/50ML
12.5 INJECTION, SOLUTION INTRAVENOUS PRN
Status: DISCONTINUED | OUTPATIENT
Start: 2018-03-12 | End: 2018-03-16 | Stop reason: HOSPADM

## 2018-03-12 RX ORDER — DEXTROSE MONOHYDRATE 50 MG/ML
100 INJECTION, SOLUTION INTRAVENOUS PRN
Status: DISCONTINUED | OUTPATIENT
Start: 2018-03-12 | End: 2018-03-16 | Stop reason: HOSPADM

## 2018-03-12 RX ADMIN — MIDODRINE HYDROCHLORIDE 10 MG: 5 TABLET ORAL at 20:15

## 2018-03-12 RX ADMIN — SPIRONOLACTONE 50 MG: 25 TABLET ORAL at 08:33

## 2018-03-12 RX ADMIN — Medication 10 ML: at 08:34

## 2018-03-12 RX ADMIN — INSULIN GLARGINE 10 UNITS: 100 INJECTION, SOLUTION SUBCUTANEOUS at 08:34

## 2018-03-12 RX ADMIN — IPRATROPIUM BROMIDE AND ALBUTEROL SULFATE 1 AMPULE: 2.5; .5 SOLUTION RESPIRATORY (INHALATION) at 15:55

## 2018-03-12 RX ADMIN — INSULIN GLARGINE 10 UNITS: 100 INJECTION, SOLUTION SUBCUTANEOUS at 20:20

## 2018-03-12 RX ADMIN — MAGNESIUM GLUCONATE 500 MG ORAL TABLET 400 MG: 500 TABLET ORAL at 08:33

## 2018-03-12 RX ADMIN — MOMETASONE FUROATE AND FORMOTEROL FUMARATE DIHYDRATE 2 PUFF: 200; 5 AEROSOL RESPIRATORY (INHALATION) at 22:15

## 2018-03-12 RX ADMIN — Medication 10 ML: at 20:16

## 2018-03-12 RX ADMIN — IPRATROPIUM BROMIDE AND ALBUTEROL SULFATE 1 AMPULE: 2.5; .5 SOLUTION RESPIRATORY (INHALATION) at 11:57

## 2018-03-12 RX ADMIN — FUROSEMIDE 20 MG: 20 TABLET ORAL at 08:33

## 2018-03-12 RX ADMIN — IPRATROPIUM BROMIDE AND ALBUTEROL SULFATE 1 AMPULE: 2.5; .5 SOLUTION RESPIRATORY (INHALATION) at 20:33

## 2018-03-12 RX ADMIN — Medication 100 MG: at 08:33

## 2018-03-12 RX ADMIN — FUROSEMIDE 20 MG: 20 TABLET ORAL at 20:15

## 2018-03-12 RX ADMIN — ASPIRIN 81 MG: 81 TABLET, COATED ORAL at 08:34

## 2018-03-12 RX ADMIN — FOLIC ACID 1 MG: 1 TABLET ORAL at 08:33

## 2018-03-12 RX ADMIN — DONEPEZIL HYDROCHLORIDE 5 MG: 5 TABLET, FILM COATED ORAL at 20:15

## 2018-03-12 RX ADMIN — IPRATROPIUM BROMIDE AND ALBUTEROL SULFATE 1 AMPULE: 2.5; .5 SOLUTION RESPIRATORY (INHALATION) at 07:51

## 2018-03-12 RX ADMIN — FERROUS SULFATE TAB 325 MG (65 MG ELEMENTAL FE) 325 MG: 325 (65 FE) TAB at 17:29

## 2018-03-12 RX ADMIN — FERROUS SULFATE TAB 325 MG (65 MG ELEMENTAL FE) 325 MG: 325 (65 FE) TAB at 08:34

## 2018-03-12 RX ADMIN — APIXABAN 5 MG: 5 TABLET, FILM COATED ORAL at 08:34

## 2018-03-12 RX ADMIN — CITALOPRAM 20 MG: 20 TABLET, FILM COATED ORAL at 08:34

## 2018-03-12 RX ADMIN — PANTOPRAZOLE SODIUM 40 MG: 40 TABLET, DELAYED RELEASE ORAL at 05:11

## 2018-03-12 RX ADMIN — MIDODRINE HYDROCHLORIDE 10 MG: 5 TABLET ORAL at 08:33

## 2018-03-12 RX ADMIN — MIDODRINE HYDROCHLORIDE 10 MG: 5 TABLET ORAL at 13:42

## 2018-03-12 RX ADMIN — MOMETASONE FUROATE AND FORMOTEROL FUMARATE DIHYDRATE 2 PUFF: 200; 5 AEROSOL RESPIRATORY (INHALATION) at 08:33

## 2018-03-12 ASSESSMENT — PAIN SCALES - GENERAL
PAINLEVEL_OUTOF10: 0

## 2018-03-12 NOTE — PROGRESS NOTES
BID WC    folic acid  1 mg Oral Daily    furosemide  20 mg Oral BID    insulin glargine  10 Units Subcutaneous BID    magnesium oxide  400 mg Oral Daily    midodrine  10 mg Oral TID    mometasone-formoterol  2 puff Inhalation BID    pantoprazole  40 mg Oral QAM AC    potassium chloride  20 mEq Oral Every Other Day    spironolactone  50 mg Oral Daily    thiamine  100 mg Oral Daily    sodium chloride flush  10 mL Intravenous 2 times per day    ipratropium-albuterol  1 ampule Inhalation 4x daily     Continuous Infusions:  PRN Meds:.acetaminophen, sodium chloride flush, ondansetron    ASSESSMENT:    Patient Active Problem List   Diagnosis    [facility]    Diabetes mellitus type 2, uncontrolled ([facility]    Essential hypertension, benign    Lymphedema of both lower extremities    History of DVT (deep vein thrombosis)    COPD (chronic obstructive pulmonary disease) (HCC)    Orthostatic hypotension    Moderate protein-calorie malnutrition (HCC)    Alzheimer's dementia with behavioral disturbance    Altered mental status    Palliative care by specialist    Chronic deep vein thrombosis (DVT) of femoral vein of right lower extremity (HCC)    Venous insufficiency of both lower extremities    Post-thrombotic syndrome of right lower extremity       PLAN:  Believe pt would be best served with s/p cath  If eliquis can be stopped this can be done      El Fuller M.D.  3/12/2018  9:44 AM

## 2018-03-12 NOTE — PROGRESS NOTES
Patient's family called, concerned that patient is confused due to patient calling family asking where his wife is at. Per family, his wife passed away 5 years ago. This RN went in and assessed patient, patient was stable. Stated he had a dream that he was sitting with his wife having a conversation and when he woke up and did not see her got concerned as to where she was. Reoriented patient. Will continue to monitor.

## 2018-03-12 NOTE — PROGRESS NOTES
Subjective:    Awake and alert. No problems overnight. Denies chest pain or dyspnea. Denies abdominal pain. Tolerating diet. No nausea or vomiting. Objective:    BP (!) 104/52   Pulse 78   Temp 97.5 °F (36.4 °C)   Resp 17   Ht 5' 10\" (1.778 m)   Wt 193 lb 3 oz (87.6 kg)   SpO2 97%   BMI 27.72 kg/m²   Skin: Warm and dry  Neck: Supple, no JVD  Heart:  RRR, no murmurs, gallops, or rubs. Lungs:  CTA bilaterally, no wheeze, rales or rhonchi  Abd: bowel sounds present, nontender, nondistended, no masses  Extrem:  No clubbing, cyanosis, or edema, pulses intact  Urine clear now    I/O last 3 completed shifts: In: 360 [P.O.:360]  Out: 825 [Urine:825]    Laboratory:     Results for Susan Malhotra (MRN [id number]) as of 3/12/2018 14:06   Ref.  Range 3/12/2018 08:26   Meter Glucose Latest Ref Range: 70 - 110 mg/dL 140 (H)       Current Facility-Administered Medications   Medication Dose Route Frequency Provider Last Rate Last Dose    acetaminophen (TYLENOL) tablet 650 mg  650 mg Oral Q4H PRN Sujatha Lucio MD   650 mg at 03/11/18 2047    aspirin EC tablet 81 mg  81 mg Oral Daily Sujatha Lucio MD   81 mg at 03/12/18 0834    citalopram (CELEXA) tablet 20 mg  20 mg Oral Daily Sujatha Lucio MD   20 mg at 03/12/18 7491    donepezil (ARICEPT) tablet 5 mg  5 mg Oral Nightly Sujatha Lucio MD   5 mg at 03/11/18 2038    ferrous sulfate tablet 325 mg  325 mg Oral BID WC Sujatha Lucio MD   325 mg at 83/88/85 4898    folic acid (FOLVITE) tablet 1 mg  1 mg Oral Daily Sujatha Lucio MD   1 mg at 03/12/18 6011    furosemide (LASIX) tablet 20 mg  20 mg Oral BID Sujatha Lucio MD   20 mg at 03/12/18 8144    insulin glargine (LANTUS) injection vial 10 Units  10 Units Subcutaneous BID Sujatha Lucio MD   10 Units at 03/12/18 0834    magnesium oxide (MAG-OX) tablet 400 mg  400 mg Oral Daily Sujatha Lucio MD   400 mg at 03/12/18 0833    midodrine (PROAMATINE) tablet 10 mg  10 mg Oral TID Yaima Alvarez

## 2018-03-13 LAB
CULTURE, BLOOD 2: NORMAL
METER GLUCOSE: 182 MG/DL (ref 70–110)
METER GLUCOSE: 188 MG/DL (ref 70–110)
METER GLUCOSE: 218 MG/DL (ref 70–110)
METER GLUCOSE: 343 MG/DL (ref 70–110)

## 2018-03-13 PROCEDURE — 2580000003 HC RX 258: Performed by: INTERNAL MEDICINE

## 2018-03-13 PROCEDURE — 94640 AIRWAY INHALATION TREATMENT: CPT

## 2018-03-13 PROCEDURE — 97535 SELF CARE MNGMENT TRAINING: CPT

## 2018-03-13 PROCEDURE — 97530 THERAPEUTIC ACTIVITIES: CPT

## 2018-03-13 PROCEDURE — 6370000000 HC RX 637 (ALT 250 FOR IP): Performed by: INTERNAL MEDICINE

## 2018-03-13 PROCEDURE — 97110 THERAPEUTIC EXERCISES: CPT

## 2018-03-13 PROCEDURE — 82962 GLUCOSE BLOOD TEST: CPT

## 2018-03-13 PROCEDURE — 2060000000 HC ICU INTERMEDIATE R&B

## 2018-03-13 RX ADMIN — MIDODRINE HYDROCHLORIDE 10 MG: 5 TABLET ORAL at 14:18

## 2018-03-13 RX ADMIN — MIDODRINE HYDROCHLORIDE 10 MG: 5 TABLET ORAL at 21:13

## 2018-03-13 RX ADMIN — IPRATROPIUM BROMIDE AND ALBUTEROL SULFATE 1 AMPULE: 2.5; .5 SOLUTION RESPIRATORY (INHALATION) at 08:28

## 2018-03-13 RX ADMIN — MOMETASONE FUROATE AND FORMOTEROL FUMARATE DIHYDRATE 2 PUFF: 200; 5 AEROSOL RESPIRATORY (INHALATION) at 21:16

## 2018-03-13 RX ADMIN — FERROUS SULFATE TAB 325 MG (65 MG ELEMENTAL FE) 325 MG: 325 (65 FE) TAB at 18:41

## 2018-03-13 RX ADMIN — ASPIRIN 81 MG: 81 TABLET, COATED ORAL at 08:55

## 2018-03-13 RX ADMIN — DONEPEZIL HYDROCHLORIDE 5 MG: 5 TABLET, FILM COATED ORAL at 21:13

## 2018-03-13 RX ADMIN — IPRATROPIUM BROMIDE AND ALBUTEROL SULFATE 1 AMPULE: 2.5; .5 SOLUTION RESPIRATORY (INHALATION) at 12:18

## 2018-03-13 RX ADMIN — INSULIN GLARGINE 10 UNITS: 100 INJECTION, SOLUTION SUBCUTANEOUS at 08:56

## 2018-03-13 RX ADMIN — Medication 10 ML: at 08:56

## 2018-03-13 RX ADMIN — MAGNESIUM GLUCONATE 500 MG ORAL TABLET 400 MG: 500 TABLET ORAL at 08:55

## 2018-03-13 RX ADMIN — POTASSIUM CHLORIDE 20 MEQ: 1500 TABLET, EXTENDED RELEASE ORAL at 08:56

## 2018-03-13 RX ADMIN — Medication 100 MG: at 08:56

## 2018-03-13 RX ADMIN — MIDODRINE HYDROCHLORIDE 10 MG: 5 TABLET ORAL at 08:56

## 2018-03-13 RX ADMIN — CITALOPRAM 20 MG: 20 TABLET, FILM COATED ORAL at 08:56

## 2018-03-13 RX ADMIN — SPIRONOLACTONE 50 MG: 25 TABLET ORAL at 08:55

## 2018-03-13 RX ADMIN — INSULIN GLARGINE 10 UNITS: 100 INJECTION, SOLUTION SUBCUTANEOUS at 21:15

## 2018-03-13 RX ADMIN — FUROSEMIDE 20 MG: 20 TABLET ORAL at 21:13

## 2018-03-13 RX ADMIN — IPRATROPIUM BROMIDE AND ALBUTEROL SULFATE 1 AMPULE: 2.5; .5 SOLUTION RESPIRATORY (INHALATION) at 20:43

## 2018-03-13 RX ADMIN — PANTOPRAZOLE SODIUM 40 MG: 40 TABLET, DELAYED RELEASE ORAL at 05:57

## 2018-03-13 RX ADMIN — Medication 10 ML: at 21:20

## 2018-03-13 RX ADMIN — FUROSEMIDE 20 MG: 20 TABLET ORAL at 08:55

## 2018-03-13 RX ADMIN — FERROUS SULFATE TAB 325 MG (65 MG ELEMENTAL FE) 325 MG: 325 (65 FE) TAB at 08:56

## 2018-03-13 RX ADMIN — FOLIC ACID 1 MG: 1 TABLET ORAL at 08:56

## 2018-03-13 RX ADMIN — IPRATROPIUM BROMIDE AND ALBUTEROL SULFATE 1 AMPULE: 2.5; .5 SOLUTION RESPIRATORY (INHALATION) at 16:28

## 2018-03-13 ASSESSMENT — PAIN SCALES - GENERAL
PAINLEVEL_OUTOF10: 0

## 2018-03-13 NOTE — PLAN OF CARE
Problem: Falls - Risk of  Goal: Absence of falls  Outcome: Met This Shift      Problem: Skin Integrity:  Goal: Absence of new skin breakdown  Absence of new skin breakdown   Outcome: Ongoing

## 2018-03-13 NOTE — PROGRESS NOTES
[facility] PROGRESS NOTE                                                                       3/13/2018          SUBJECTIVE:    Afebrile  eliquis on hold  s/p cath planned for thurs 15 mar    OBJECTIVE:    /62   Pulse 85   Temp 98.4 °F (36.9 °C) (Temporal)   Resp 18   Ht 5' 10\" (1.778 m)   Wt 188 lb 14.4 oz (85.7 kg)   SpO2 98%   BMI 27.10 kg/m²     PHYSICAL EXAMINATION:  Skin: dry, without rashes  Respirations: non-labored, intact  Abdomen: soft, non-tender, non-distended      Lab Results   Component Value Date    WBC 5.0 03/10/2018    HGB 12.9 03/10/2018    HCT 37.7 03/10/2018    MCV 89.5 03/10/2018     03/10/2018       Lab Results   Component Value Date    CREATININE 0.7 03/10/2018       Lab Results   Component Value Date    PSA 0.28 10/15/2015    PSA 0.24 04/10/2015    PSA 0.20 10/24/2014       REVIEW OF SYSTEMS:    CONSTITUTIONAL: negative  HEENT: negative  HEMATOLOGIC: negative  ENDOCRINE: negative  RESPIRATORY: negative  CV: negative  GI: negative  NEURO: negative  ORTHOPEDICS: negative  PSYCHIATRIC: negative  : as above    PAST FAMILY HISTORY:  History reviewed. No pertinent family history. PAST SOCIAL HISTORY:    Social History     Social History    Marital status:       Spouse name: N/A    Number of children: 2    Years of education: 15     Occupational History    mill      Social History Main Topics    Smoking status: Former Smoker     Packs/day: 3.00     Years: 20.00     Types: Cigarettes     Quit date: 1/1/1980    Smokeless tobacco: Never Used    Alcohol use No    Drug use: No    Sexual activity: No     Other Topics Concern    None     Social History Narrative    None       Scheduled Meds:   aspirin  81 mg Oral Daily    citalopram  20 mg Oral Daily    donepezil  5 mg Oral Nightly    ferrous sulfate  325 mg Oral BID WC    folic acid  1 mg Oral Daily    furosemide  20 mg Oral BID    insulin glargine  10 Units Subcutaneous BID    magnesium oxide  400 mg Oral Daily    midodrine  10 mg Oral TID    mometasone-formoterol  2 puff Inhalation BID    pantoprazole  40 mg Oral QAM AC    potassium chloride  20 mEq Oral Every Other Day    spironolactone  50 mg Oral Daily    thiamine  100 mg Oral Daily    sodium chloride flush  10 mL Intravenous 2 times per day    ipratropium-albuterol  1 ampule Inhalation 4x daily     Continuous Infusions:   dextrose       PRN Meds:.glucose, dextrose, glucagon (rDNA), dextrose, acetaminophen, sodium chloride flush, ondansetron    ASSESSMENT:    Patient Active Problem List   Diagnosis    [facility]osis ([facility] [address]    Diabetes mellitus type 2, uncontrolled ([facility] [facility]    Essential hypertension, benign    Lymphedema of both lower extremities    History of DVT (deep vein thrombosis)    COPD (chronic obstructive pulmonary disease) (HCC)    Orthostatic hypotension    Moderate protein-calorie malnutrition (HCC)    Alzheimer's dementia with behavioral disturbance    Altered mental status    Palliative care by specialist    Chronic deep vein thrombosis (DVT) of femoral vein of right lower extremity (HCC)    Venous insufficiency of both lower extremities    Post-thrombotic syndrome of right lower extremity       PLAN:  s/p cath on 15 mar    Tomy Hernandez M.D.  3/13/2018  1:46 PM

## 2018-03-13 NOTE — PROGRESS NOTES
OT BEDSIDE TREATMENT NOTE      Date:3/13/2018  Patient Name: Doroteo West  MRN: [id number]  : 1940  Room: [address] [place] [address]     Per OT Eval:   AM - PAC Daily Activity Inpatient Raw Score: 10/24     Diagnosis / Problem List:   1. Altered mental status, unspecified altered mental status type    2. Hematuria, unspecified type           Patient Active Problem List   Diagnosis    Cirrhosis ([facility]    Diabetes mellitus type 2, uncontrolled ([facility]    Essential hypertension, benign    Lymphedema of both lower extremities    History of DVT (deep vein thrombosis)    [facility]) [facility]    Orthostatic hypotension    Moderate protein-calorie malnutrition ([facility]    Alzheimer's dementia with behavioral disturbance      Past Medical History:   Past Medical History        Past Medical History:   Diagnosis Date    Anemia      Arthritis      CHF (congestive heart failure) (HCC)      Chronic back pain      COPD (chronic obstructive pulmonary disease) (HCC)      Deep vein thrombosis ([facility] 2001     Right popliteal/right femoral vein. Patient is on chronic Coumadin therapy. 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 Patient had been evaluated by Dr. Mary Beth Gutierrez and was advised knee-high compression stockings.  Degenerative joint disease       Both knees.  Diabetes mellitus ([facility]      GERD (gastroesophageal reflux disease)      H/O chronic prostatitis       S/P prostate biopsy in 2001.  Hypercoagulable state ([facility]       Patient is homozygote for IBAN-1 and MTHFR with decreased anti-thrombiin-3 level.     Hypertension      Lymphedema of both lower extremities 2016    Mitral valve insufficiency      Neurogenic bladder      Renal calculus      S/P IVC filter 2016    Thoracic aortic aneurysm (HCC)      Unspecified cirrhosis of liver (HCC)           Precautions: falls, confused, likes to talk , DVT RLE

## 2018-03-13 NOTE — PROGRESS NOTES
Subjective:    Awake and alert. Confused overnight. Denies chest pain or dyspnea. Denies abdominal pain. Tolerating diet. No nausea or vomiting. Objective:    /60   Pulse 85   Temp 98.4 °F (36.9 °C) (Temporal)   Resp 16   Ht 5' 10\" (1.778 m)   Wt 188 lb 14.4 oz (85.7 kg)   SpO2 93%   BMI 27.10 kg/m²   Skin: Warm and dry  Neck: Supple, no JVD  Heart:  RRR, no murmurs, gallops, or rubs. Lungs:  CTA bilaterally, no wheeze, rales or rhonchi  Abd: bowel sounds present, nontender, nondistended, no masses  Extrem:  No clubbing, cyanosis, or edema, pulses intact    I/O last 3 completed shifts: In: 1 [P.O.:960; I.V.:10]  Out: 3550 [Urine:3550]    Laboratory:     Value: 24 Hours- no growth   Gram stain performed from blood culture bottle media   Gram positive cocci in clusters   Growth in anaerobic bottle, aerobic bottle continues   Evaluating for Anaerobic Gram Positive Cocci   Growth in anaerobic bottle only    (Abnormal)       Results for Cherie Mehta (MRN [id number]) as of 3/13/2018 08:50   Ref.  Range 3/13/2018 05:50   Meter Glucose Latest Ref Range: 70 - 110 mg/dL 182 (H)       Current Facility-Administered Medications   Medication Dose Route Frequency Provider Last Rate Last Dose    glucose (GLUTOSE) 40 % oral gel 15 g  15 g Oral PRN Mick aHrkinsn, DO        dextrose 50 % solution 12.5 g  12.5 g Intravenous PRN Mick Harkinsn, DO        glucagon (rDNA) injection 1 mg  1 mg Intramuscular PRN Mick Batista, DO        dextrose 5 % solution  100 mL/hr Intravenous PRN Mick Harkinsn, DO        acetaminophen (TYLENOL) tablet 650 mg  650 mg Oral Q4H PRN Felix Plummer MD   650 mg at 03/11/18 2047    aspirin EC tablet 81 mg  81 mg Oral Daily Felix Plummer MD   81 mg at 03/12/18 0834    citalopram (CELEXA) tablet 20 mg  20 mg Oral Daily Felix Plummer MD   20 mg at 03/12/18 0834    donepezil (ARICEPT) tablet 5 mg  5 mg Oral Nightly Felix Plummer MD   5 mg at 03/12/18 2015   

## 2018-03-13 NOTE — PROGRESS NOTES
seated rest d/t increased fatigue and visibly SOB. Checked SPO2 pt was 98 %. Cued for PLB , but pt is a \" mouth \" breather. Pt returned to sit in B/S chair     Pt was left B/S chair with call light left by patient. nsg aware, and Son present     Chair/bed alarm: NA     Time in: 1315  Time out: 1355      Continue with physical therapy current plan of care.     Margarita Fears PTA [id number]

## 2018-03-14 ENCOUNTER — ANESTHESIA EVENT (OUTPATIENT)
Dept: OPERATING ROOM | Age: 78
DRG: 300 | End: 2018-03-14
Payer: COMMERCIAL

## 2018-03-14 LAB
ALBUMIN SERPL-MCNC: 2.1 G/DL (ref 3.5–5.2)
ALP BLD-CCNC: 60 U/L (ref 40–129)
ALT SERPL-CCNC: 9 U/L (ref 0–40)
ANION GAP SERPL CALCULATED.3IONS-SCNC: 12 MMOL/L (ref 7–16)
AST SERPL-CCNC: 13 U/L (ref 0–39)
BILIRUB SERPL-MCNC: 0.3 MG/DL (ref 0–1.2)
BILIRUBIN DIRECT: <0.2 MG/DL (ref 0–0.3)
BILIRUBIN, INDIRECT: NORMAL MG/DL (ref 0–1)
BUN BLDV-MCNC: 17 MG/DL (ref 8–23)
CALCIUM SERPL-MCNC: 8.1 MG/DL (ref 8.6–10.2)
CHLORIDE BLD-SCNC: 105 MMOL/L (ref 98–107)
CO2: 25 MMOL/L (ref 22–29)
CREAT SERPL-MCNC: 0.8 MG/DL (ref 0.7–1.2)
GFR AFRICAN AMERICAN: >60
GFR NON-AFRICAN AMERICAN: >60 ML/MIN/1.73
GLUCOSE BLD-MCNC: 118 MG/DL (ref 74–109)
HCT VFR BLD CALC: 40.5 % (ref 37–54)
HEMOGLOBIN: 13.9 G/DL (ref 12.5–16.5)
MCH RBC QN AUTO: 30.5 PG (ref 26–35)
MCHC RBC AUTO-ENTMCNC: 34.3 % (ref 32–34.5)
MCV RBC AUTO: 89 FL (ref 80–99.9)
METER GLUCOSE: 187 MG/DL (ref 70–110)
METER GLUCOSE: 238 MG/DL (ref 70–110)
METER GLUCOSE: 329 MG/DL (ref 70–110)
PDW BLD-RTO: 14.6 FL (ref 11.5–15)
PLATELET # BLD: 242 E9/L (ref 130–450)
PMV BLD AUTO: 10 FL (ref 7–12)
POTASSIUM SERPL-SCNC: 4 MMOL/L (ref 3.5–5)
RBC # BLD: 4.55 E12/L (ref 3.8–5.8)
SODIUM BLD-SCNC: 142 MMOL/L (ref 132–146)
TOTAL PROTEIN: 5.1 G/DL (ref 6.4–8.3)
WBC # BLD: 6.2 E9/L (ref 4.5–11.5)

## 2018-03-14 PROCEDURE — 6360000002 HC RX W HCPCS: Performed by: INTERNAL MEDICINE

## 2018-03-14 PROCEDURE — 82248 BILIRUBIN DIRECT: CPT

## 2018-03-14 PROCEDURE — 6370000000 HC RX 637 (ALT 250 FOR IP): Performed by: INTERNAL MEDICINE

## 2018-03-14 PROCEDURE — 94640 AIRWAY INHALATION TREATMENT: CPT

## 2018-03-14 PROCEDURE — 2060000000 HC ICU INTERMEDIATE R&B

## 2018-03-14 PROCEDURE — 6370000000 HC RX 637 (ALT 250 FOR IP): Performed by: PSYCHIATRY & NEUROLOGY

## 2018-03-14 PROCEDURE — 36415 COLL VENOUS BLD VENIPUNCTURE: CPT

## 2018-03-14 PROCEDURE — 82962 GLUCOSE BLOOD TEST: CPT

## 2018-03-14 PROCEDURE — 85027 COMPLETE CBC AUTOMATED: CPT

## 2018-03-14 PROCEDURE — 80053 COMPREHEN METABOLIC PANEL: CPT

## 2018-03-14 PROCEDURE — 2580000003 HC RX 258: Performed by: INTERNAL MEDICINE

## 2018-03-14 PROCEDURE — 99223 1ST HOSP IP/OBS HIGH 75: CPT | Performed by: PSYCHIATRY & NEUROLOGY

## 2018-03-14 RX ORDER — FUROSEMIDE 20 MG/1
20 TABLET ORAL 2 TIMES DAILY
Status: DISCONTINUED | OUTPATIENT
Start: 2018-03-14 | End: 2018-03-16 | Stop reason: HOSPADM

## 2018-03-14 RX ORDER — DIVALPROEX SODIUM 250 MG/1
250 TABLET, EXTENDED RELEASE ORAL 2 TIMES DAILY
Status: DISCONTINUED | OUTPATIENT
Start: 2018-03-14 | End: 2018-03-16 | Stop reason: HOSPADM

## 2018-03-14 RX ORDER — FUROSEMIDE 10 MG/ML
20 INJECTION INTRAMUSCULAR; INTRAVENOUS 2 TIMES DAILY
Status: DISCONTINUED | OUTPATIENT
Start: 2018-03-14 | End: 2018-03-14

## 2018-03-14 RX ORDER — DONEPEZIL HYDROCHLORIDE 5 MG/1
10 TABLET, FILM COATED ORAL NIGHTLY
Status: DISCONTINUED | OUTPATIENT
Start: 2018-03-14 | End: 2018-03-16 | Stop reason: HOSPADM

## 2018-03-14 RX ADMIN — MOMETASONE FUROATE AND FORMOTEROL FUMARATE DIHYDRATE 2 PUFF: 200; 5 AEROSOL RESPIRATORY (INHALATION) at 20:42

## 2018-03-14 RX ADMIN — MIDODRINE HYDROCHLORIDE 10 MG: 5 TABLET ORAL at 08:33

## 2018-03-14 RX ADMIN — MIDODRINE HYDROCHLORIDE 10 MG: 5 TABLET ORAL at 15:38

## 2018-03-14 RX ADMIN — IPRATROPIUM BROMIDE AND ALBUTEROL SULFATE 1 AMPULE: 2.5; .5 SOLUTION RESPIRATORY (INHALATION) at 10:26

## 2018-03-14 RX ADMIN — SPIRONOLACTONE 50 MG: 25 TABLET ORAL at 08:33

## 2018-03-14 RX ADMIN — Medication 10 ML: at 20:44

## 2018-03-14 RX ADMIN — PANTOPRAZOLE SODIUM 40 MG: 40 TABLET, DELAYED RELEASE ORAL at 06:28

## 2018-03-14 RX ADMIN — ACETAMINOPHEN 650 MG: 325 TABLET, FILM COATED ORAL at 01:13

## 2018-03-14 RX ADMIN — INSULIN GLARGINE 10 UNITS: 100 INJECTION, SOLUTION SUBCUTANEOUS at 20:46

## 2018-03-14 RX ADMIN — FOLIC ACID 1 MG: 1 TABLET ORAL at 08:33

## 2018-03-14 RX ADMIN — FERROUS SULFATE TAB 325 MG (65 MG ELEMENTAL FE) 325 MG: 325 (65 FE) TAB at 18:41

## 2018-03-14 RX ADMIN — INSULIN GLARGINE 10 UNITS: 100 INJECTION, SOLUTION SUBCUTANEOUS at 08:36

## 2018-03-14 RX ADMIN — Medication 1.5 G: at 11:45

## 2018-03-14 RX ADMIN — ASPIRIN 81 MG: 81 TABLET, COATED ORAL at 08:32

## 2018-03-14 RX ADMIN — Medication 10 ML: at 08:33

## 2018-03-14 RX ADMIN — FUROSEMIDE 20 MG: 20 TABLET ORAL at 08:33

## 2018-03-14 RX ADMIN — MOMETASONE FUROATE AND FORMOTEROL FUMARATE DIHYDRATE 2 PUFF: 200; 5 AEROSOL RESPIRATORY (INHALATION) at 08:36

## 2018-03-14 RX ADMIN — IPRATROPIUM BROMIDE AND ALBUTEROL SULFATE 1 AMPULE: 2.5; .5 SOLUTION RESPIRATORY (INHALATION) at 13:57

## 2018-03-14 RX ADMIN — DIVALPROEX SODIUM 250 MG: 250 TABLET, FILM COATED, EXTENDED RELEASE ORAL at 18:41

## 2018-03-14 RX ADMIN — FERROUS SULFATE TAB 325 MG (65 MG ELEMENTAL FE) 325 MG: 325 (65 FE) TAB at 08:33

## 2018-03-14 RX ADMIN — MIDODRINE HYDROCHLORIDE 10 MG: 5 TABLET ORAL at 20:41

## 2018-03-14 RX ADMIN — DONEPEZIL HYDROCHLORIDE 10 MG: 5 TABLET, FILM COATED ORAL at 20:41

## 2018-03-14 RX ADMIN — IPRATROPIUM BROMIDE AND ALBUTEROL SULFATE 1 AMPULE: 2.5; .5 SOLUTION RESPIRATORY (INHALATION) at 18:59

## 2018-03-14 RX ADMIN — FUROSEMIDE 20 MG: 20 TABLET ORAL at 18:41

## 2018-03-14 RX ADMIN — CITALOPRAM 20 MG: 20 TABLET, FILM COATED ORAL at 08:33

## 2018-03-14 RX ADMIN — Medication 100 MG: at 08:33

## 2018-03-14 RX ADMIN — MAGNESIUM GLUCONATE 500 MG ORAL TABLET 400 MG: 500 TABLET ORAL at 08:33

## 2018-03-14 ASSESSMENT — PAIN SCALES - GENERAL
PAINLEVEL_OUTOF10: 5
PAINLEVEL_OUTOF10: 0

## 2018-03-14 NOTE — PROGRESS NOTES
with behavioral disturbance    Altered mental status    Palliative care by specialist    Chronic deep vein thrombosis (DVT) of femoral vein of right lower extremity (HCC)    Venous insufficiency of both lower extremities    Post-thrombotic syndrome of right lower extremity      Bacteremia due to gram positive organism         Plan:    1. Continue vancomycin    2. Pharmacy to dose vancomycin    3. Continue physical therapy    4.  Suprapubic catheter in a.m.    5. Anticipate subacute rehab on discharge       GRUPO Camarena D.O.  1:16 PM  3/14/2018

## 2018-03-14 NOTE — CONSULTS
right ESWL    TONSILLECTOMY      VENA CAVA FILTER PLACEMENT  10/07/2015    Lea Amos     Allergies:     Sulfa antibiotics    Medications:     Prior to Admission medications    Medication Sig Start Date End Date Taking?  Authorizing Provider   furosemide (LASIX) 40 MG tablet Take 0.5 tablets by mouth 2 times daily 2/5/18   Zoë Moon, DO   spironolactone (ALDACTONE) 50 MG tablet Take 1 tablet by mouth daily 2/6/18   Zoë Gatesi, DO   insulin glargine (LANTUS) 100 UNIT/ML injection vial Inject 10 Units into the skin 2 times daily 10/4/17   Zoë Gatesi, DO   insulin lispro (HUMALOG) 100 UNIT/ML injection vial Inject 0-12 Units into the skin 3 times daily (with meals) 10/4/17   Zoë Moon, DO   potassium chloride (KLOR-CON M) 20 MEQ extended release tablet Take 1 tablet by mouth every other day 10/4/17   Zoë Moon, DO   pantoprazole (PROTONIX) 40 MG tablet Take 40 mg by mouth daily    Historical Provider, MD   citalopram (CELEXA) 20 MG tablet Take 20 mg by mouth daily    Historical Provider, MD   acetaminophen (TYLENOL) 325 MG tablet Take 2 tablets by mouth every 4 hours as needed for Fever (Temp greater than 100.5, for pain score 1-3) 6/1/17   Zulema Lopez MD   mineral oil-hydrophilic petrolatum (HYDROPHOR) ointment Apply topically to scrotum coccyx kendra area daily 6/1/17   Zulema Lopez MD   vitamin B-1 100 MG tablet Take 1 tablet by mouth daily 6/1/17   Zulema Lopez MD   magnesium oxide (MAG-OX) 400 (240 MG) MG tablet Take 1 tablet by mouth daily 4/11/17   Davidson Hale MD   apixaban Mendez Tovar) 5 MG TABS tablet Take 1 tablet by mouth 2 times daily 12/7/16   Zulema Lopez MD   fluticasone [facility]) 50 MCG/ACT nasal spray 2 sprays by Nasal route daily 12/7/16   Zulema Lopez MD   aspirin 81 MG EC tablet Take 81 mg by mouth daily    Historical Provider, MD   ferrous sulfate 325 (65 FE) MG tablet Take 1 tablet by mouth 2 times daily (with meals) 7/5/16   Jordan Lynch throughout  Normal bulk and tone   No drift  No abnormal movements    Sensory:  LT and PP normal  Vibration decreased distally--Dr WHALEY    Coordination:   FN, FFM and ONUR normal  HS normal    Gait:  Unremarkable--Dr WHALEY    DTR:   Right Brachioradialis reflex 1+  Left Brachioradialis reflex 1+  Right Biceps reflex 1+  Left Biceps reflex 1+  Right Triceps reflex 1+  Left Triceps reflex 1+  Right Quadriceps reflex 1+  Left Quadriceps reflex 1+  Right Achilles reflex 1+  Left Achilles reflex 1+    No Babinskis  No Putnam's   Bilateral grasp reflexes but no suck--Dr WHALEY  No other pathological reflexes    Neurological examination reveals no other focal neurological deficits--Dr WHALEY    Laboratory/Radiology:     CBC:   Lab Results   Component Value Date    WBC 6.2 03/14/2018    RBC 4.55 03/14/2018    HGB 13.9 03/14/2018    HCT 40.5 03/14/2018    MCV 89.0 03/14/2018    MCH 30.5 03/14/2018    MCHC 34.3 03/14/2018    RDW 14.6 03/14/2018     03/14/2018    MPV 10.0 03/14/2018     CMP:    Lab Results   Component Value Date     03/14/2018    K 4.0 03/14/2018     03/14/2018    CO2 25 03/14/2018    BUN 17 03/14/2018    CREATININE 0.8 03/14/2018    GFRAA >60 03/14/2018    LABGLOM >60 03/14/2018    GLUCOSE 118 03/14/2018    GLUCOSE 319 12/30/2011    PROT 5.1 03/14/2018    LABALBU 2.1 03/14/2018    CALCIUM 8.1 03/14/2018    BILITOT 0.3 03/14/2018    ALKPHOS 60 03/14/2018    AST 13 03/14/2018    ALT 9 03/14/2018       CT head: Chronic small vessel disease--Dr WHALEY    Assessment: This is 76year old man with history ofAlzheimer's dementia, on donepezil 5 mg from home, admitted 5 days ago with altered mental status. He is now sundowning in the hospital.  He requires increasing doses of donepezil as well as Namenda in the future. In the interim, Depakote will be given 250 mg twice daily to hopefully improve the sundowning. His dementia is likely Alzheimer's[facility]. --Dr JOVANA Camacho is medically stable and awaiting his supra-pubic catheter placement. --Dr WHALEY    Plan:     Increase his donepezil to 10 mg. Ziggy Shah MD PGY-2  Internal Medicine     Depakote was added at 250 mg twice daily    In one month add [address]    Neurology will sign off    I spent 70 minutes personally with the pt and staff in examination, chart and imaging review and treatment of the pt's disorder ----in addition to discussing the case with the resident.

## 2018-03-14 NOTE — PROGRESS NOTES
3/14/2018 8:59 AM  Service: Urology  Group: [facility] (Anshul/Sonia/Gra[facility])    Suzan Horan  [id number]    Subjective:  Afebrile  Tolerating catheter  No abdominal pain  Complains of generalized weakness and difficulty walking    Review of Systems  Respiratory: no cough  Cardiovascular: no chest pain  Gastrointestinal: good appetite. No abdominal pain  Hematologic/lymphatic: no gross hemorrhage  Musculoskeletal: no joint pain  Neurological: no headache  Endocrine: no excessive thirst    Scheduled Meds:   aspirin  81 mg Oral Daily    citalopram  20 mg Oral Daily    donepezil  5 mg Oral Nightly    ferrous sulfate  325 mg Oral BID WC    folic acid  1 mg Oral Daily    furosemide  20 mg Oral BID    insulin glargine  10 Units Subcutaneous BID    magnesium oxide  400 mg Oral Daily    midodrine  10 mg Oral TID    mometasone-formoterol  2 puff Inhalation BID    pantoprazole  40 mg Oral QAM AC    potassium chloride  20 mEq Oral Every Other Day    spironolactone  50 mg Oral Daily    thiamine  100 mg Oral Daily    sodium chloride flush  10 mL Intravenous 2 times per day    ipratropium-albuterol  1 ampule Inhalation 4x daily       Objective:  Vitals:    03/14/18 0830   BP: (!) 94/50   Pulse:    Resp: 18   Temp: 97.9 °F (36.6 °C)   SpO2:          Allergies: Sulfa antibiotics    General Appearance: alert and oriented to person, place and time and in no acute distress  Skin: no rash or erythema  Head: normocephalic and atraumatic  Pulmonary/Chest:  normal air movement, no respiratory distress   Abdomen: soft, non-tender, non-distended  Genitalia:  Granados well positioned and draining clear urine.     Labs:     Recent Labs      03/14/18   0538   NA  142   K  4.0   CL  105   CO2  25   BUN  17   CREATININE  0.8   GLUCOSE  118*   CALCIUM  8.1*       Lab Results   Component Value Date    HGB 13.9 03/14/2018    HCT 40.5 03/14/2018         Assessment/Plan:  Neurogenic bladder with chronic catheter  S/P TURP 2006  Plan S/P

## 2018-03-14 NOTE — ANESTHESIA PRE PROCEDURE
Department of Anesthesiology  Preprocedure Note       Name:  Cori Potter   Age:  76 y.o.  :  1940                                          MRN:  [id number]         Date:  3/14/2018      Surgeon: Paz Gonzalez):  Vipin Medina MD    Procedure: Procedure(s):  CYSTOSCOPY SUPRAPUBIC TUBE PLACEMENT    Medications prior to admission:   Prior to Admission medications    Medication Sig Start Date End Date Taking?  Authorizing Provider   furosemide (LASIX) 40 MG tablet Take 0.5 tablets by mouth 2 times daily 18   Lucinda Raman, DO   spironolactone (ALDACTONE) 50 MG tablet Take 1 tablet by mouth daily 18   Lucinda Lamine, DO   insulin glargine (LANTUS) 100 UNIT/ML injection vial Inject 10 Units into the skin 2 times daily 10/4/17   Lucinda Raman, DO   insulin lispro (HUMALOG) 100 UNIT/ML injection vial Inject 0-12 Units into the skin 3 times daily (with meals) 10/4/17   Lucinda Raman, DO   potassium chloride (KLOR-CON M) 20 MEQ extended release tablet Take 1 tablet by mouth every other day 10/4/17   Lucinda Raman, DO   pantoprazole (PROTONIX) 40 MG tablet Take 40 mg by mouth daily    Historical Provider, MD   citalopram (CELEXA) 20 MG tablet Take 20 mg by mouth daily    Historical Provider, MD   acetaminophen (TYLENOL) 325 MG tablet Take 2 tablets by mouth every 4 hours as needed for Fever (Temp greater than 100.5, for pain score 1-3) 17   Mally De Santiago MD   mineral oil-hydrophilic petrolatum (HYDROPHOR) ointment Apply topically to scrotum coccyx kendra area daily 17   Mally De Santiago MD   vitamin B-1 100 MG tablet Take 1 tablet by mouth daily 17   Mally De Santiago MD   magnesium oxide (MAG-OX) 400 (240 MG) MG tablet Take 1 tablet by mouth daily 17   Nate Duran MD   apixaban Stephanie Garcia) 5 MG TABS tablet Take 1 tablet by mouth 2 times daily 16   Mally De Santiago MD   fluticasone Victorino Romano) 50 MCG/ACT nasal spray 2 sprays by Nasal route daily 16   Mally De Santiago,  Cirrhosis ([facility] K74.60    Diabetes mellitus type 2, uncontrolled ([facility] E11.65    Essential hypertension, benign I10    Lymphedema of both lower extremities I89.0    History of DVT (deep vein thrombosis) Z86.718    COPD (chronic obstructive pulmonary disease) ([facility]) J44.9    Orthostatic hypotension I95.1    Moderate protein-calorie malnutrition ([facility]) E44.0    Alzheimer's dementia with behavioral disturbance G30.8, F02.81    Altered mental status R41.82    Palliative care by specialist Z51.5    Chronic deep vein thrombosis (DVT) of femoral vein of right lower extremity ([facility]) I82.511    Venous insufficiency of both lower extremities I87.2    Post-thrombotic syndrome of right lower extremity I87.001       Past Medical History:        Diagnosis Date    Anemia     Arthritis     CHF (congestive heart failure) ([facility])     Chronic back pain     Chronic deep vein thrombosis (DVT) of femoral vein of right lower extremity ([facility]) 3/9/2018    COPD (chronic obstructive pulmonary disease) ([facility])     [facility] 4/2001    Right popliteal/right femoral vein. Patient is on chronic Coumadin therapy. 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 Patient had been evaluated by Dr. Cris Armstrong and was advised knee-high compression stockings.  Degenerative joint disease     Both knees.  Diabetes mellitus ([facility]     GERD (gastroesophageal reflux disease)     H/O chronic prostatitis     S/P prostate biopsy in 12/2001.  Hypercoagulable state ([facility]     Patient is homozygote for IBAN-1 and MTHFR with decreased anti-thrombiin-3 level.     Hypertension     Lymphedema of both lower extremities 5/28/2016    Mitral valve insufficiency     Neurogenic bladder     Post-thrombotic syndrome of right lower extremity 3/10/2018    Renal calculus     S/P IVC filter 5/28/2016    Thoracic aortic aneurysm (HCC)     Unspecified cirrhosis of liver ([facility]     Venous insufficiency of both lower extremities 3/10/2018       Past Surgical History:        Procedure Laterality Date    BRONCHOSCOPY  4/8/15    CHOLECYSTECTOMY      COLONOSCOPY      CYSTOSCOPY  08/16/2016    retrogrades. stent changed.  DENTAL SURGERY      Full mouth extraction.  EYE SURGERY      JUAN C LENSE IMPLANTS    FOOT SURGERY Bilateral     Correction of bone abnormality.  OTHER SURGICAL HISTORY  10/15/15    right ESWL    TONSILLECTOMY      VENA CAVA FILTER PLACEMENT  10/07/2015    Lea Amos       Social History:    Social History   Substance Use Topics    Smoking status: Former Smoker     Packs/day: 3.00     Years: 20.00     Types: Cigarettes     Quit date: 1/1/1980    Smokeless tobacco: Never Used    Alcohol use No                                Counseling given: Not Answered      Vital Signs (Current):   Vitals:    03/13/18 2345 03/14/18 0419 03/14/18 0830 03/14/18 1133   BP: 117/60 (!) 107/56 (!) 94/50 (!) 115/59   Pulse: 77 76 72 79   Resp: 18 18 18 18   Temp: 36.8 °C (98.3 °F) 36.5 °C (97.7 °F) 36.6 °C (97.9 °F) 35.9 °C (96.7 °F)   TempSrc: Temporal  Temporal    SpO2: 97% 97% 98%    Weight:       Height:                                                  BP Readings from Last 3 Encounters:   03/14/18 (!) 115/59   02/08/18 (!) 148/72   01/12/18 102/60       NPO Status:                           instructed NPO @ midnight                                                      BMI:   Wt Readings from Last 3 Encounters:   03/13/18 188 lb 14.4 oz (85.7 kg)   02/02/18 189 lb (85.7 kg)   01/12/18 199 lb 8 oz (90.5 kg)     Body mass index is 27.1 kg/m².     CBC:   Lab Results   Component Value Date    WBC 6.2 03/14/2018    RBC 4.55 03/14/2018    HGB 13.9 03/14/2018    HCT 40.5 03/14/2018    MCV 89.0 03/14/2018    RDW 14.6 03/14/2018     03/14/2018       CMP:   Lab Results   Component Value Date     03/14/2018    K 4.0 03/14/2018     03/14/2018    CO2 25 03/14/2018    BUN 17

## 2018-03-15 ENCOUNTER — APPOINTMENT (OUTPATIENT)
Dept: GENERAL RADIOLOGY | Age: 78
DRG: 300 | End: 2018-03-15
Payer: COMMERCIAL

## 2018-03-15 ENCOUNTER — ANESTHESIA (OUTPATIENT)
Dept: OPERATING ROOM | Age: 78
DRG: 300 | End: 2018-03-15
Payer: COMMERCIAL

## 2018-03-15 VITALS
RESPIRATION RATE: 12 BRPM | DIASTOLIC BLOOD PRESSURE: 54 MMHG | SYSTOLIC BLOOD PRESSURE: 93 MMHG | OXYGEN SATURATION: 100 %

## 2018-03-15 LAB
METER GLUCOSE: 143 MG/DL (ref 70–110)
METER GLUCOSE: 170 MG/DL (ref 70–110)
METER GLUCOSE: 214 MG/DL (ref 70–110)
METER GLUCOSE: 328 MG/DL (ref 70–110)

## 2018-03-15 PROCEDURE — 6370000000 HC RX 637 (ALT 250 FOR IP): Performed by: UROLOGY

## 2018-03-15 PROCEDURE — 0T9B40Z DRAINAGE OF BLADDER WITH DRAINAGE DEVICE, PERCUTANEOUS ENDOSCOPIC APPROACH: ICD-10-PCS | Performed by: UROLOGY

## 2018-03-15 PROCEDURE — 2060000000 HC ICU INTERMEDIATE R&B

## 2018-03-15 PROCEDURE — 6360000002 HC RX W HCPCS: Performed by: INTERNAL MEDICINE

## 2018-03-15 PROCEDURE — 6360000004 HC RX CONTRAST MEDICATION: Performed by: UROLOGY

## 2018-03-15 PROCEDURE — 3600000012 HC SURGERY LEVEL 2 ADDTL 15MIN: Performed by: UROLOGY

## 2018-03-15 PROCEDURE — 6370000000 HC RX 637 (ALT 250 FOR IP): Performed by: PSYCHIATRY & NEUROLOGY

## 2018-03-15 PROCEDURE — 2580000003 HC RX 258: Performed by: ANESTHESIOLOGIST ASSISTANT

## 2018-03-15 PROCEDURE — 74420 UROGRAPHY RTRGR +-KUB: CPT

## 2018-03-15 PROCEDURE — 3700000000 HC ANESTHESIA ATTENDED CARE: Performed by: UROLOGY

## 2018-03-15 PROCEDURE — 2580000003 HC RX 258: Performed by: INTERNAL MEDICINE

## 2018-03-15 PROCEDURE — C1758 CATHETER, URETERAL: HCPCS | Performed by: UROLOGY

## 2018-03-15 PROCEDURE — 3700000001 HC ADD 15 MINUTES (ANESTHESIA): Performed by: UROLOGY

## 2018-03-15 PROCEDURE — 6360000002 HC RX W HCPCS: Performed by: ANESTHESIOLOGIST ASSISTANT

## 2018-03-15 PROCEDURE — 7100000000 HC PACU RECOVERY - FIRST 15 MIN: Performed by: UROLOGY

## 2018-03-15 PROCEDURE — 2580000003 HC RX 258: Performed by: UROLOGY

## 2018-03-15 PROCEDURE — BT14ZZZ FLUOROSCOPY OF KIDNEYS, URETERS AND BLADDER: ICD-10-PCS | Performed by: UROLOGY

## 2018-03-15 PROCEDURE — 6370000000 HC RX 637 (ALT 250 FOR IP): Performed by: ANESTHESIOLOGY

## 2018-03-15 PROCEDURE — 6370000000 HC RX 637 (ALT 250 FOR IP): Performed by: INTERNAL MEDICINE

## 2018-03-15 PROCEDURE — 7100000001 HC PACU RECOVERY - ADDTL 15 MIN: Performed by: UROLOGY

## 2018-03-15 PROCEDURE — 94640 AIRWAY INHALATION TREATMENT: CPT

## 2018-03-15 PROCEDURE — 3600000002 HC SURGERY LEVEL 2 BASE: Performed by: UROLOGY

## 2018-03-15 PROCEDURE — 6360000002 HC RX W HCPCS: Performed by: NURSE PRACTITIONER

## 2018-03-15 PROCEDURE — 82962 GLUCOSE BLOOD TEST: CPT

## 2018-03-15 RX ORDER — OXYCODONE HYDROCHLORIDE AND ACETAMINOPHEN 5; 325 MG/1; MG/1
1 TABLET ORAL PRN
Status: COMPLETED | OUTPATIENT
Start: 2018-03-15 | End: 2018-03-15

## 2018-03-15 RX ORDER — FENTANYL CITRATE 50 UG/ML
INJECTION, SOLUTION INTRAMUSCULAR; INTRAVENOUS PRN
Status: DISCONTINUED | OUTPATIENT
Start: 2018-03-15 | End: 2018-03-15 | Stop reason: SDUPTHER

## 2018-03-15 RX ORDER — CEPHALEXIN 500 MG/1
500 CAPSULE ORAL EVERY 8 HOURS SCHEDULED
Status: DISCONTINUED | OUTPATIENT
Start: 2018-03-15 | End: 2018-03-16

## 2018-03-15 RX ORDER — MORPHINE SULFATE 2 MG/ML
1 INJECTION, SOLUTION INTRAMUSCULAR; INTRAVENOUS EVERY 5 MIN PRN
Status: DISCONTINUED | OUTPATIENT
Start: 2018-03-15 | End: 2018-03-15 | Stop reason: HOSPADM

## 2018-03-15 RX ORDER — MEPERIDINE HYDROCHLORIDE 50 MG/ML
12.5 INJECTION INTRAMUSCULAR; INTRAVENOUS; SUBCUTANEOUS
Status: DISCONTINUED | OUTPATIENT
Start: 2018-03-15 | End: 2018-03-15 | Stop reason: HOSPADM

## 2018-03-15 RX ORDER — MORPHINE SULFATE 2 MG/ML
2 INJECTION, SOLUTION INTRAMUSCULAR; INTRAVENOUS EVERY 5 MIN PRN
Status: DISCONTINUED | OUTPATIENT
Start: 2018-03-15 | End: 2018-03-15 | Stop reason: HOSPADM

## 2018-03-15 RX ORDER — ONDANSETRON 2 MG/ML
4 INJECTION INTRAMUSCULAR; INTRAVENOUS
Status: DISCONTINUED | OUTPATIENT
Start: 2018-03-15 | End: 2018-03-15 | Stop reason: HOSPADM

## 2018-03-15 RX ORDER — OXYCODONE HYDROCHLORIDE AND ACETAMINOPHEN 5; 325 MG/1; MG/1
2 TABLET ORAL PRN
Status: COMPLETED | OUTPATIENT
Start: 2018-03-15 | End: 2018-03-15

## 2018-03-15 RX ORDER — SODIUM CHLORIDE, SODIUM LACTATE, POTASSIUM CHLORIDE, CALCIUM CHLORIDE 600; 310; 30; 20 MG/100ML; MG/100ML; MG/100ML; MG/100ML
INJECTION, SOLUTION INTRAVENOUS CONTINUOUS
Status: DISCONTINUED | OUTPATIENT
Start: 2018-03-15 | End: 2018-03-16 | Stop reason: HOSPADM

## 2018-03-15 RX ORDER — PROPOFOL 10 MG/ML
INJECTION, EMULSION INTRAVENOUS CONTINUOUS PRN
Status: DISCONTINUED | OUTPATIENT
Start: 2018-03-15 | End: 2018-03-15 | Stop reason: SDUPTHER

## 2018-03-15 RX ORDER — INSULIN GLARGINE 100 [IU]/ML
20 INJECTION, SOLUTION SUBCUTANEOUS 2 TIMES DAILY
Status: DISCONTINUED | OUTPATIENT
Start: 2018-03-15 | End: 2018-03-16 | Stop reason: HOSPADM

## 2018-03-15 RX ORDER — SODIUM CHLORIDE 9 MG/ML
INJECTION, SOLUTION INTRAVENOUS CONTINUOUS PRN
Status: DISCONTINUED | OUTPATIENT
Start: 2018-03-15 | End: 2018-03-15 | Stop reason: SDUPTHER

## 2018-03-15 RX ADMIN — FUROSEMIDE 20 MG: 20 TABLET ORAL at 18:36

## 2018-03-15 RX ADMIN — DONEPEZIL HYDROCHLORIDE 10 MG: 5 TABLET, FILM COATED ORAL at 20:42

## 2018-03-15 RX ADMIN — FENTANYL CITRATE 50 MCG: 50 INJECTION, SOLUTION INTRAMUSCULAR; INTRAVENOUS at 08:00

## 2018-03-15 RX ADMIN — FERROUS SULFATE TAB 325 MG (65 MG ELEMENTAL FE) 325 MG: 325 (65 FE) TAB at 10:44

## 2018-03-15 RX ADMIN — CEPHALEXIN 500 MG: 500 CAPSULE ORAL at 13:41

## 2018-03-15 RX ADMIN — ASPIRIN 81 MG: 81 TABLET, COATED ORAL at 10:44

## 2018-03-15 RX ADMIN — PANTOPRAZOLE SODIUM 40 MG: 40 TABLET, DELAYED RELEASE ORAL at 06:11

## 2018-03-15 RX ADMIN — SODIUM CHLORIDE: 9 INJECTION, SOLUTION INTRAVENOUS at 07:48

## 2018-03-15 RX ADMIN — FOLIC ACID 1 MG: 1 TABLET ORAL at 10:43

## 2018-03-15 RX ADMIN — INSULIN GLARGINE 10 UNITS: 100 INJECTION, SOLUTION SUBCUTANEOUS at 11:59

## 2018-03-15 RX ADMIN — MIDODRINE HYDROCHLORIDE 10 MG: 5 TABLET ORAL at 20:42

## 2018-03-15 RX ADMIN — SODIUM CHLORIDE, POTASSIUM CHLORIDE, SODIUM LACTATE AND CALCIUM CHLORIDE: 600; 310; 30; 20 INJECTION, SOLUTION INTRAVENOUS at 13:41

## 2018-03-15 RX ADMIN — DIVALPROEX SODIUM 250 MG: 250 TABLET, FILM COATED, EXTENDED RELEASE ORAL at 10:44

## 2018-03-15 RX ADMIN — MOMETASONE FUROATE AND FORMOTEROL FUMARATE DIHYDRATE 2 PUFF: 200; 5 AEROSOL RESPIRATORY (INHALATION) at 10:45

## 2018-03-15 RX ADMIN — FERROUS SULFATE TAB 325 MG (65 MG ELEMENTAL FE) 325 MG: 325 (65 FE) TAB at 18:36

## 2018-03-15 RX ADMIN — FUROSEMIDE 20 MG: 20 TABLET ORAL at 10:42

## 2018-03-15 RX ADMIN — MAGNESIUM GLUCONATE 500 MG ORAL TABLET 400 MG: 500 TABLET ORAL at 10:44

## 2018-03-15 RX ADMIN — PROPOFOL 100 MCG/KG/MIN: 10 INJECTION, EMULSION INTRAVENOUS at 07:45

## 2018-03-15 RX ADMIN — Medication 10 ML: at 10:45

## 2018-03-15 RX ADMIN — Medication 1.5 G: at 10:42

## 2018-03-15 RX ADMIN — POTASSIUM CHLORIDE 20 MEQ: 1500 TABLET, EXTENDED RELEASE ORAL at 10:44

## 2018-03-15 RX ADMIN — CITALOPRAM 20 MG: 20 TABLET, FILM COATED ORAL at 10:43

## 2018-03-15 RX ADMIN — MOMETASONE FUROATE AND FORMOTEROL FUMARATE DIHYDRATE 2 PUFF: 200; 5 AEROSOL RESPIRATORY (INHALATION) at 20:42

## 2018-03-15 RX ADMIN — INSULIN LISPRO 8 UNITS: 100 INJECTION, SOLUTION INTRAVENOUS; SUBCUTANEOUS at 21:13

## 2018-03-15 RX ADMIN — MIDODRINE HYDROCHLORIDE 10 MG: 5 TABLET ORAL at 13:41

## 2018-03-15 RX ADMIN — FENTANYL CITRATE 50 MCG: 50 INJECTION, SOLUTION INTRAMUSCULAR; INTRAVENOUS at 07:45

## 2018-03-15 RX ADMIN — MIDODRINE HYDROCHLORIDE 10 MG: 5 TABLET ORAL at 10:43

## 2018-03-15 RX ADMIN — CEPHALEXIN 500 MG: 500 CAPSULE ORAL at 22:18

## 2018-03-15 RX ADMIN — Medication 100 MG: at 10:44

## 2018-03-15 RX ADMIN — IPRATROPIUM BROMIDE AND ALBUTEROL SULFATE 1 AMPULE: 2.5; .5 SOLUTION RESPIRATORY (INHALATION) at 19:40

## 2018-03-15 RX ADMIN — INSULIN GLARGINE 20 UNITS: 100 INJECTION, SOLUTION SUBCUTANEOUS at 21:11

## 2018-03-15 RX ADMIN — DIVALPROEX SODIUM 250 MG: 250 TABLET, FILM COATED, EXTENDED RELEASE ORAL at 18:36

## 2018-03-15 RX ADMIN — OXYCODONE HYDROCHLORIDE AND ACETAMINOPHEN 1 TABLET: 5; 325 TABLET ORAL at 22:16

## 2018-03-15 RX ADMIN — SPIRONOLACTONE 50 MG: 25 TABLET ORAL at 10:43

## 2018-03-15 RX ADMIN — CEFAZOLIN SODIUM 1 G: 1 SOLUTION INTRAVENOUS at 07:54

## 2018-03-15 ASSESSMENT — PAIN SCALES - GENERAL
PAINLEVEL_OUTOF10: 0
PAINLEVEL_OUTOF10: 6
PAINLEVEL_OUTOF10: 6
PAINLEVEL_OUTOF10: 0
PAINLEVEL_OUTOF10: 0

## 2018-03-15 ASSESSMENT — PULMONARY FUNCTION TESTS
PIF_VALUE: 0
PIF_VALUE: 1
PIF_VALUE: 0
PIF_VALUE: 1
PIF_VALUE: 0

## 2018-03-15 NOTE — PROGRESS NOTES
Subjective:    Awake and alert. No problems overnight. Denies chest pain or dyspnea. Denies abdominal pain. Tolerated procedure well, no complications    Objective:    BP (!) 102/57   Pulse 64   Temp 97.5 °F (36.4 °C) (Oral)   Resp 16   Ht 5' 10\" (1.778 m)   Wt 188 lb 14.4 oz (85.7 kg)   SpO2 99%   BMI 27.10 kg/m²   Skin: Warm and dry  Neck: Supple, no JVD  Heart:  RRR, no murmurs, gallops, or rubs. Lungs:  CTA bilaterally, no wheeze, rales or rhonchi  Abd: bowel sounds present, nontender, nondistended, no masses  Extrem:  No clubbing, cyanosis, or edema, pulses intact    I/O last 3 completed shifts: In: 660 [P.O.:460; I.V.:200]  Out: 3325 [Urine:3325]    Laboratory:     POCT Glucose [[id number]] (Abnormal) Collected: 03/15/18 1159   Updated: 03/15/18 1205     Meter Glucose 170 (H) mg/dL   FL RETROGRADE Edra Garcia WO KUB [[id number]] Resulted: 03/15/18 1022   Updated: 03/15/18 1025    Narrative:     Patient MRN: [id number]  : 1940  Age:  77 years  Gender: Male  Order Date: 3/15/2018 9:00 AM  Exam: FL RETROGRADE PYELOGRAM W WO KUB  Number of Images: 2 views  Indication:  R31.9 Hematuria, unspecified type   hematuria  Comparison: None. Findings:    Demographic pages submitted which demonstrates 18 seconds of  fluoroscopy. 2 images demonstrate evidence for contrast within the left and right  ureter. I cannot be certain if there is a catheter in the right  ureter. An IVC filter is noted. No filling defect is seen.  The distal  right left ureter not seen   Impression:     No definite filling defect identified     POCT Glucose [[id number]] (Abnormal) Collected: 03/15/18 0614   Updated: 03/15/18 0617     Meter Glucose 143 (H) mg/dL   POCT Glucose [[id number]] (Abnormal) Collected: 18 2040   Updated: 18 205     Meter Glucose 329 (H) mg/dL         Current Facility-Administered Medications   Medication Dose Route Frequency Provider Last Rate Last Dose    oxyCODONE-acetaminophen

## 2018-03-15 NOTE — ANESTHESIA POSTPROCEDURE EVALUATION
Department of Anesthesiology  Postprocedure Note    Patient: Saloni Cohen  MRN: [id number]  YOB: 1940  Date of evaluation: 3/15/2018  Time:  9:16 AM     Procedure Summary     Date:  03/15/18 Room / Location:  [facility]  / [facility]    Anesthesia Start:  5143 Anesthesia Stop:  6379    Procedure:  CYSTOSCOPY SUPRAPUBIC TUBE PLACEMENT (N/A Abdomen) Diagnosis:  (.)    Surgeon:  Roula Palomo MD Responsible Provider:  Amara Robbins DO    Anesthesia Type:  MAC ASA Status:  4          Anesthesia Type: MAC    Viri Phase I: Viri Score: 10    Viri Phase II:      Last vitals: Reviewed and per EMR flowsheets.        Anesthesia Post Evaluation    Patient location during evaluation: PACU  Patient participation: complete - patient participated  Level of consciousness: awake and alert  Airway patency: patent  Nausea & Vomiting: no nausea and no vomiting  Complications: no  Cardiovascular status: blood pressure returned to baseline  Respiratory status: acceptable  Hydration status: euvolemic

## 2018-03-16 VITALS
WEIGHT: 188.9 LBS | DIASTOLIC BLOOD PRESSURE: 56 MMHG | HEART RATE: 74 BPM | BODY MASS INDEX: 27.04 KG/M2 | TEMPERATURE: 97.6 F | HEIGHT: 70 IN | RESPIRATION RATE: 16 BRPM | SYSTOLIC BLOOD PRESSURE: 101 MMHG | OXYGEN SATURATION: 99 %

## 2018-03-16 LAB
METER GLUCOSE: 104 MG/DL (ref 70–110)
METER GLUCOSE: 199 MG/DL (ref 70–110)

## 2018-03-16 PROCEDURE — 6370000000 HC RX 637 (ALT 250 FOR IP): Performed by: INTERNAL MEDICINE

## 2018-03-16 PROCEDURE — 6370000000 HC RX 637 (ALT 250 FOR IP): Performed by: UROLOGY

## 2018-03-16 PROCEDURE — 82962 GLUCOSE BLOOD TEST: CPT

## 2018-03-16 PROCEDURE — 2580000003 HC RX 258: Performed by: UROLOGY

## 2018-03-16 PROCEDURE — 6370000000 HC RX 637 (ALT 250 FOR IP): Performed by: PSYCHIATRY & NEUROLOGY

## 2018-03-16 RX ORDER — DIVALPROEX SODIUM 250 MG/1
250 TABLET, EXTENDED RELEASE ORAL 2 TIMES DAILY
Qty: 30 TABLET | Refills: 3 | Status: ON HOLD | DISCHARGE
Start: 2018-03-16 | End: 2019-01-01 | Stop reason: HOSPADM

## 2018-03-16 RX ADMIN — Medication 100 MG: at 09:10

## 2018-03-16 RX ADMIN — CITALOPRAM 20 MG: 20 TABLET, FILM COATED ORAL at 09:10

## 2018-03-16 RX ADMIN — DIVALPROEX SODIUM 250 MG: 250 TABLET, FILM COATED, EXTENDED RELEASE ORAL at 09:10

## 2018-03-16 RX ADMIN — FERROUS SULFATE TAB 325 MG (65 MG ELEMENTAL FE) 325 MG: 325 (65 FE) TAB at 09:10

## 2018-03-16 RX ADMIN — FOLIC ACID 1 MG: 1 TABLET ORAL at 09:10

## 2018-03-16 RX ADMIN — MOMETASONE FUROATE AND FORMOTEROL FUMARATE DIHYDRATE 2 PUFF: 200; 5 AEROSOL RESPIRATORY (INHALATION) at 09:11

## 2018-03-16 RX ADMIN — INSULIN GLARGINE 20 UNITS: 100 INJECTION, SOLUTION SUBCUTANEOUS at 09:11

## 2018-03-16 RX ADMIN — MAGNESIUM GLUCONATE 500 MG ORAL TABLET 400 MG: 500 TABLET ORAL at 09:10

## 2018-03-16 RX ADMIN — ASPIRIN 81 MG: 81 TABLET, COATED ORAL at 09:10

## 2018-03-16 RX ADMIN — SODIUM CHLORIDE, POTASSIUM CHLORIDE, SODIUM LACTATE AND CALCIUM CHLORIDE: 600; 310; 30; 20 INJECTION, SOLUTION INTRAVENOUS at 06:46

## 2018-03-16 RX ADMIN — MIDODRINE HYDROCHLORIDE 10 MG: 5 TABLET ORAL at 09:11

## 2018-03-16 RX ADMIN — SPIRONOLACTONE 50 MG: 25 TABLET ORAL at 09:11

## 2018-03-16 RX ADMIN — FUROSEMIDE 20 MG: 20 TABLET ORAL at 09:10

## 2018-03-16 RX ADMIN — CEPHALEXIN 500 MG: 500 CAPSULE ORAL at 06:09

## 2018-03-16 RX ADMIN — MIDODRINE HYDROCHLORIDE 10 MG: 5 TABLET ORAL at 14:08

## 2018-03-16 RX ADMIN — PANTOPRAZOLE SODIUM 40 MG: 40 TABLET, DELAYED RELEASE ORAL at 06:09

## 2018-03-16 ASSESSMENT — PAIN SCALES - GENERAL: PAINLEVEL_OUTOF10: 0

## 2018-03-16 NOTE — DISCHARGE SUMMARY
Physician Discharge Summary     Patient ID:  Terrance Robison  [id number]  19 y.o.  1940    Admit date: 3/8/2018    Discharge date and time: 3/16/2018  3:27 PM     Admission Diagnoses: AMS  .     Discharge Diagnoses:                 [facility]    Diabetes mellitus type 2, uncontrolled ([facility]    Essential hypertension, benign    Lymphedema of both lower extremities    History of DVT (deep vein thrombosis)    COPD (chronic obstructive pulmonary disease) (HCC)    Orthostatic hypotension    Moderate protein-calorie malnutrition (HCC)    Alzheimer's dementia with behavioral disturbance    Altered mental status    Palliative care by specialist    Chronic deep vein thrombosis (DVT) of femoral vein of right lower extremity (HCC)    Venous insufficiency of both lower extremities    Post-thrombotic syndrome of right lower extremity      Bacteremia due to gram positive organism  Status post placement of suprapubic catheter         Consults: neurology, urology and vascular surgery    Procedures:   Km Bowser MD Physician Signed Urology  Brief Op Note Date of Service: 3/15/2018  7:41 AM   Case ID: [id number] Case Time: 3/15/2018  7:41 AM Surgeon: Km Bowser MD   Procedure: CYSTOSCOPY SUPRAPUBIC TUBE PLACEMENT Location: [facility] OR          []Hide copied text  []Hover for attribution information  Brief Postoperative Note  ______________________________________________________________     Patient: Terrance Robison  YOB: 1940  MRN: [id number]  Date of Procedure: 3/15/2018     Pre-Op Diagnosis: . Neurogenic bladder     Post-Op Diagnosis: Same       Procedure(s):  CYSTOSCOPY SUPRAPUBIC TUBE PLACEMENT retrograde pyelogram     Anesthesia: Monitor Anesthesia Care     Surgeon(s):  Km Bowser MD     Staff:  Scrub Person First: Cherry Rangel      Estimated Blood Loss: nil     Complications: None     Specimens:      Implants:         Drains:        Urethral Catheter 20 fr (Active)   Catheter Indications Need for fluid management in critically ill patients in a critical care setting not able to be managed by other means such as BSC with hat, bedpan, urinal, condom catheter, or short term intermittent urethral catherization 3/15/2018 12:00 AM   Site Assessment No urethral drainage 3/14/2018  8:57 AM   Urine Color Yellow 3/15/2018 12:00 AM   Urine Appearance Clear 3/15/2018 12:00 AM   Urine Odor Malodorous 2/6/2018  3:26 AM   Output (mL) 1175 mL 3/15/2018  6:20 AM       Urethral Catheter Double-lumen 20 fr (Active)       Urethral Catheter Double-lumen; Latex 30 fr (Active)         Findings:      Anthony Frederick MD  Date: 3/15/2018  Time: 8:25 AM            Laboratory:   Last 3 BMP  Recent Labs      03/14/18   0538   NA  142   K  4.0   CL  105   CO2  25   BUN  17   CREATININE  0.8   GLUCOSE  118*   CALCIUM  8.1*       Last 3 CBC:  Recent Labs      03/14/18   0538   WBC  6.2   RBC  4.55   HGB  13.9   HCT  40.5   MCV  89.0   MCH  30.5   MCHC  34.3   RDW  14.6   PLT  242   MPV  10.0       No results for input(s): PROTIME, INR in the last 72 hours.     Hospital Course:     Admitted with extensive deep vein thrombosis; history of chronic lower extremity edema; seen by urology because of neurogenic bladder; developed gram positive cocci bacteremia; placed on vancomycin therapy; suprapubic catheter placed; antibiotics discontinued postoperatively; patient discharged to rehab in stable condition    Discharge Exam:  See progress note from today    Disposition: [facility]    Patient Instructions:   Current Discharge Medication List      CONTINUE these medications which have NOT CHANGED    Details   furosemide (LASIX) 40 MG tablet Take 0.5 tablets by mouth 2 times daily  Qty: 60 tablet, Refills: 0      spironolactone (ALDACTONE) 50 MG tablet Take 1 tablet by mouth daily  Qty: 30 tablet, Refills: 0      insulin glargine (LANTUS) 100 UNIT/ML injection vial Inject 10 Units into the skin 2 times daily  Qty: 1 vial, Refills: 3 insulin lispro (HUMALOG) 100 UNIT/ML injection vial Inject 0-12 Units into the skin 3 times daily (with meals)  Qty: 1 vial, Refills: 3      potassium chloride (KLOR-CON M) 20 MEQ extended release tablet Take 1 tablet by mouth every other day      pantoprazole (PROTONIX) 40 MG tablet Take 40 mg by mouth daily      citalopram (CELEXA) 20 MG tablet Take 20 mg by mouth daily      acetaminophen (TYLENOL) 325 MG tablet Take 2 tablets by mouth every 4 hours as needed for Fever (Temp greater than 100.5, for pain score 1-3)  Qty: 120 tablet, Refills: 3      mineral oil-hydrophilic petrolatum (HYDROPHOR) ointment Apply topically to scrotum coccyx kendra area daily  Refills: 0      vitamin B-1 100 MG tablet Take 1 tablet by mouth daily  Qty: 30 tablet, Refills: 3      magnesium oxide (MAG-OX) 400 (240 MG) MG tablet Take 1 tablet by mouth daily  Qty: 30 tablet, Refills: 0      apixaban (ELIQUIS) 5 MG TABS tablet Take 1 tablet by mouth 2 times daily  Qty: 60 tablet, Refills: 0      fluticasone (FLONASE) 50 MCG/ACT nasal spray 2 sprays by Nasal route daily  Qty: 1 Bottle, Refills: 0      aspirin 81 MG EC tablet Take 81 mg by mouth daily      ferrous sulfate 325 (65 FE) MG tablet Take 1 tablet by mouth 2 times daily (with meals)  Qty: 30 tablet, Refills: 3      ipratropium-albuterol (DUONEB) 0.5-2.5 (3) MG/3ML SOLN nebulizer solution Inhale 3 mLs into the lungs every 4 hours (while awake)  Qty: 360 mL      mometasone-formoterol (DULERA) 200-5 MCG/ACT inhaler Inhale 2 puffs into the lungs 2 times daily  Qty: 1 Inhaler, Refills: 0      midodrine (PROAMATINE) 10 MG tablet Take 1 tablet by mouth 3 times daily  Qty: 90 tablet, Refills: 3      folic acid (FOLVITE) 1 MG tablet Take 1 tablet by mouth daily. Qty: 90 tablet, Refills: 0      magnesium hydroxide (MILK OF MAGNESIA) 400 MG/5ML suspension Take 30 mLs by mouth daily as needed for Constipation. donepezil (ARICEPT) 5 MG tablet Take 1 tablet by mouth nightly.   Qty: 30 tablet, Refills: 0           Activity: activity as tolerated  Diet: cardiac diet    Follow-up with Dr Otila Epley in 1 month. Note that over 30 minutes was spent in preparing discharge papers, discussing discharge with patient, medication review, etc.    Signed:  GRUPO Aleman  3/16/2018  9:53 AM

## 2018-03-18 LAB
BLOOD CULTURE, ROUTINE: ABNORMAL
BLOOD CULTURE, ROUTINE: ABNORMAL
ORGANISM: ABNORMAL

## 2018-04-07 LAB
EKG ATRIAL RATE: 75 BPM
EKG P AXIS: 63 DEGREES
EKG P-R INTERVAL: 210 MS
EKG Q-T INTERVAL: 370 MS
EKG QRS DURATION: 84 MS
EKG QTC CALCULATION (BAZETT): 413 MS
EKG T AXIS: 71 DEGREES
EKG VENTRICULAR RATE: 75 BPM

## 2018-04-16 PROBLEM — G30.9 ALZHEIMER'S DEMENTIA WITH BEHAVIORAL DISTURBANCE (HCC): Chronic | Status: ACTIVE | Noted: 2018-03-08

## 2018-04-16 PROBLEM — F02.818 ALZHEIMER'S DEMENTIA WITH BEHAVIORAL DISTURBANCE (HCC): Chronic | Status: ACTIVE | Noted: 2018-03-08

## 2018-04-16 PROBLEM — R31.9 HEMATURIA: Status: ACTIVE | Noted: 2018-01-01

## 2018-04-17 PROBLEM — I87.2 VENOUS INSUFFICIENCY OF BOTH LOWER EXTREMITIES: Chronic | Status: RESOLVED | Noted: 2018-03-10 | Resolved: 2018-01-01

## 2018-04-17 PROBLEM — I82.511 CHRONIC DEEP VEIN THROMBOSIS (DVT) OF FEMORAL VEIN OF RIGHT LOWER EXTREMITY (HCC): Chronic | Status: RESOLVED | Noted: 2018-03-09 | Resolved: 2018-01-01

## 2018-04-17 PROBLEM — I87.001 POST-THROMBOTIC SYNDROME OF RIGHT LOWER EXTREMITY: Chronic | Status: RESOLVED | Noted: 2018-03-10 | Resolved: 2018-01-01

## 2018-04-17 PROBLEM — N31.9 NEUROGENIC BLADDER: Chronic | Status: ACTIVE | Noted: 2018-01-01

## 2018-04-17 PROBLEM — N20.0 NEPHROLITHIASIS: Chronic | Status: ACTIVE | Noted: 2018-01-01

## 2018-04-17 PROBLEM — I95.1 ORTHOSTATIC HYPOTENSION: Chronic | Status: RESOLVED | Noted: 2017-04-03 | Resolved: 2018-01-01

## 2018-06-04 PROBLEM — E87.70 VOLUME OVERLOAD: Status: ACTIVE | Noted: 2018-01-01

## 2018-06-04 PROBLEM — I50.41 ACUTE COMBINED SYSTOLIC AND DIASTOLIC CHF, NYHA CLASS 1 (HCC): Status: ACTIVE | Noted: 2018-01-01

## 2019-01-01 ENCOUNTER — APPOINTMENT (OUTPATIENT)
Dept: CT IMAGING | Age: 79
DRG: 698 | End: 2019-01-01
Payer: MEDICARE

## 2019-01-01 ENCOUNTER — APPOINTMENT (OUTPATIENT)
Dept: GENERAL RADIOLOGY | Age: 79
DRG: 698 | End: 2019-01-01
Payer: MEDICARE

## 2019-01-01 ENCOUNTER — HOSPITAL ENCOUNTER (INPATIENT)
Age: 79
LOS: 4 days | Discharge: NURSING, REHAB OR LONG-TERM CARE | DRG: 698 | End: 2019-04-10
Attending: EMERGENCY MEDICINE | Admitting: FAMILY MEDICINE
Payer: MEDICARE

## 2019-01-01 ENCOUNTER — HOSPITAL ENCOUNTER (EMERGENCY)
Age: 79
Discharge: HOME | End: 2019-01-03
Attending: EMERGENCY MEDICINE
Payer: COMMERCIAL

## 2019-01-01 VITALS
RESPIRATION RATE: 18 BRPM | WEIGHT: 185.9 LBS | OXYGEN SATURATION: 96 % | TEMPERATURE: 97.7 F | SYSTOLIC BLOOD PRESSURE: 118 MMHG | HEIGHT: 69 IN | DIASTOLIC BLOOD PRESSURE: 62 MMHG | BODY MASS INDEX: 27.53 KG/M2 | HEART RATE: 99 BPM

## 2019-01-01 VITALS
WEIGHT: 215 LBS | HEART RATE: 100 BPM | DIASTOLIC BLOOD PRESSURE: 80 MMHG | RESPIRATION RATE: 18 BRPM | HEIGHT: 71 IN | TEMPERATURE: 98.6 F | SYSTOLIC BLOOD PRESSURE: 130 MMHG | OXYGEN SATURATION: 94 % | BODY MASS INDEX: 30.1 KG/M2

## 2019-01-01 DIAGNOSIS — A41.9 SEPTICEMIA (HCC): ICD-10-CM

## 2019-01-01 DIAGNOSIS — E86.0 DEHYDRATION: ICD-10-CM

## 2019-01-01 DIAGNOSIS — G30.1 LATE ONSET ALZHEIMER'S DISEASE WITH BEHAVIORAL DISTURBANCE (HCC): Chronic | ICD-10-CM

## 2019-01-01 DIAGNOSIS — F02.818 LATE ONSET ALZHEIMER'S DISEASE WITH BEHAVIORAL DISTURBANCE (HCC): Chronic | ICD-10-CM

## 2019-01-01 DIAGNOSIS — R06.02 SOB (SHORTNESS OF BREATH): Primary | ICD-10-CM

## 2019-01-01 DIAGNOSIS — R33.9 URINARY RETENTION: Primary | ICD-10-CM

## 2019-01-01 DIAGNOSIS — N31.9 NEUROGENIC BLADDER: Chronic | ICD-10-CM

## 2019-01-01 LAB
ALBUMIN SERPL-MCNC: 2.1 G/DL (ref 3.5–5.2)
ALP BLD-CCNC: 80 U/L (ref 40–129)
ALT SERPL-CCNC: 7 U/L (ref 0–40)
ANION GAP SERPL CALCULATED.3IONS-SCNC: 10 MMOL/L (ref 7–16)
ANION GAP SERPL CALCULATED.3IONS-SCNC: 14 MMOL/L (ref 7–16)
ANION GAP SERPL CALCULATED.3IONS-SCNC: 18 MMOL/L (ref 7–16)
ANION GAP SERPL CALCULATED.3IONS-SCNC: 18 MMOL/L (ref 7–16)
ANISOCYTOSIS: ABNORMAL
AST SERPL-CCNC: 9 U/L (ref 0–39)
BACTERIA: ABNORMAL /HPF
BASOPHILS ABSOLUTE: 0 E9/L (ref 0–0.2)
BASOPHILS ABSOLUTE: 0.04 E9/L (ref 0–0.2)
BASOPHILS ABSOLUTE: 0.05 E9/L (ref 0–0.2)
BASOPHILS ABSOLUTE: 0.06 E9/L (ref 0–0.2)
BASOPHILS RELATIVE PERCENT: 0.2 % (ref 0–2)
BASOPHILS RELATIVE PERCENT: 0.2 % (ref 0–2)
BASOPHILS RELATIVE PERCENT: 0.4 % (ref 0–2)
BASOPHILS RELATIVE PERCENT: 0.4 % (ref 0–2)
BILIRUB SERPL-MCNC: 0.8 MG/DL (ref 0–1.2)
BILIRUBIN URINE: NEGATIVE
BLOOD, URINE: ABNORMAL
BUN BLDV-MCNC: 10 MG/DL (ref 8–23)
BUN BLDV-MCNC: 11 MG/DL (ref 8–23)
BUN BLDV-MCNC: 11 MG/DL (ref 8–23)
BUN BLDV-MCNC: 12 MG/DL (ref 8–23)
BURR CELLS: ABNORMAL
CALCIUM SERPL-MCNC: 7.9 MG/DL (ref 8.6–10.2)
CALCIUM SERPL-MCNC: 7.9 MG/DL (ref 8.6–10.2)
CALCIUM SERPL-MCNC: 8 MG/DL (ref 8.6–10.2)
CALCIUM SERPL-MCNC: 8.1 MG/DL (ref 8.6–10.2)
CHLORIDE BLD-SCNC: 107 MMOL/L (ref 98–107)
CHLORIDE BLD-SCNC: 111 MMOL/L (ref 98–107)
CHLORIDE BLD-SCNC: 113 MMOL/L (ref 98–107)
CHLORIDE BLD-SCNC: 118 MMOL/L (ref 98–107)
CLARITY: ABNORMAL
CO2: 20 MMOL/L (ref 22–29)
CO2: 22 MMOL/L (ref 22–29)
CO2: 25 MMOL/L (ref 22–29)
CO2: 25 MMOL/L (ref 22–29)
COLOR: YELLOW
CREAT SERPL-MCNC: 0.7 MG/DL (ref 0.7–1.2)
CREAT SERPL-MCNC: 0.7 MG/DL (ref 0.7–1.2)
CREAT SERPL-MCNC: 0.8 MG/DL (ref 0.7–1.2)
CREAT SERPL-MCNC: 0.8 MG/DL (ref 0.7–1.2)
CULTURE, BLOOD 2: NORMAL
EKG ATRIAL RATE: 121 BPM
EKG P AXIS: 57 DEGREES
EKG P-R INTERVAL: 176 MS
EKG Q-T INTERVAL: 308 MS
EKG QRS DURATION: 72 MS
EKG QTC CALCULATION (BAZETT): 437 MS
EKG R AXIS: -13 DEGREES
EKG T AXIS: 55 DEGREES
EKG VENTRICULAR RATE: 121 BPM
EOSINOPHILS ABSOLUTE: 0.03 E9/L (ref 0.05–0.5)
EOSINOPHILS ABSOLUTE: 0.1 E9/L (ref 0.05–0.5)
EOSINOPHILS ABSOLUTE: 0.14 E9/L (ref 0.05–0.5)
EOSINOPHILS ABSOLUTE: 0.16 E9/L (ref 0.05–0.5)
EOSINOPHILS RELATIVE PERCENT: 0.2 % (ref 0–6)
EOSINOPHILS RELATIVE PERCENT: 0.8 % (ref 0–6)
EOSINOPHILS RELATIVE PERCENT: 0.9 % (ref 0–6)
EOSINOPHILS RELATIVE PERCENT: 0.9 % (ref 0–6)
GFR AFRICAN AMERICAN: >60
GFR NON-AFRICAN AMERICAN: >60 ML/MIN/1.73
GLUCOSE BLD-MCNC: 174 MG/DL (ref 74–99)
GLUCOSE BLD-MCNC: 190 MG/DL (ref 74–99)
GLUCOSE BLD-MCNC: 234 MG/DL (ref 74–99)
GLUCOSE BLD-MCNC: 97 MG/DL (ref 74–99)
GLUCOSE URINE: NEGATIVE MG/DL
HCT VFR BLD CALC: 38 % (ref 37–54)
HCT VFR BLD CALC: 39.7 % (ref 37–54)
HCT VFR BLD CALC: 39.9 % (ref 37–54)
HCT VFR BLD CALC: 40.8 % (ref 37–54)
HCT VFR BLD CALC: 41.7 % (ref 37–54)
HEMOGLOBIN: 12.3 G/DL (ref 12.5–16.5)
HEMOGLOBIN: 12.6 G/DL (ref 12.5–16.5)
HEMOGLOBIN: 13.3 G/DL (ref 12.5–16.5)
HEMOGLOBIN: 13.4 G/DL (ref 12.5–16.5)
HEMOGLOBIN: 13.5 G/DL (ref 12.5–16.5)
IMMATURE GRANULOCYTES #: 0.15 E9/L
IMMATURE GRANULOCYTES #: 0.25 E9/L
IMMATURE GRANULOCYTES #: 0.28 E9/L
IMMATURE GRANULOCYTES %: 0.8 % (ref 0–5)
IMMATURE GRANULOCYTES %: 1.8 % (ref 0–5)
IMMATURE GRANULOCYTES %: 2.1 % (ref 0–5)
INR BLD: 1.6
KETONES, URINE: NEGATIVE MG/DL
LACTIC ACID: 1.4 MMOL/L (ref 0.5–2.2)
LEUKOCYTE ESTERASE, URINE: ABNORMAL
LYMPHOCYTES ABSOLUTE: 0 E9/L (ref 1.5–4)
LYMPHOCYTES ABSOLUTE: 0.24 E9/L (ref 1.5–4)
LYMPHOCYTES ABSOLUTE: 0.27 E9/L (ref 1.5–4)
LYMPHOCYTES ABSOLUTE: 0.31 E9/L (ref 1.5–4)
LYMPHOCYTES RELATIVE PERCENT: 1.6 % (ref 20–42)
LYMPHOCYTES RELATIVE PERCENT: 1.7 % (ref 20–42)
LYMPHOCYTES RELATIVE PERCENT: 2.1 % (ref 20–42)
LYMPHOCYTES RELATIVE PERCENT: 2.2 % (ref 20–42)
MAGNESIUM: 2 MG/DL (ref 1.6–2.6)
MAGNESIUM: 2 MG/DL (ref 1.6–2.6)
MCH RBC QN AUTO: 29.9 PG (ref 26–35)
MCH RBC QN AUTO: 30 PG (ref 26–35)
MCH RBC QN AUTO: 30.4 PG (ref 26–35)
MCH RBC QN AUTO: 30.6 PG (ref 26–35)
MCH RBC QN AUTO: 30.8 PG (ref 26–35)
MCHC RBC AUTO-ENTMCNC: 31.6 % (ref 32–34.5)
MCHC RBC AUTO-ENTMCNC: 32.1 % (ref 32–34.5)
MCHC RBC AUTO-ENTMCNC: 32.4 % (ref 32–34.5)
MCHC RBC AUTO-ENTMCNC: 32.6 % (ref 32–34.5)
MCHC RBC AUTO-ENTMCNC: 34 % (ref 32–34.5)
MCV RBC AUTO: 90.6 FL (ref 80–99.9)
MCV RBC AUTO: 92.7 FL (ref 80–99.9)
MCV RBC AUTO: 93.1 FL (ref 80–99.9)
MCV RBC AUTO: 93.2 FL (ref 80–99.9)
MCV RBC AUTO: 96.8 FL (ref 80–99.9)
METER GLUCOSE: 113 MG/DL (ref 74–99)
METER GLUCOSE: 124 MG/DL (ref 74–99)
METER GLUCOSE: 133 MG/DL (ref 74–99)
METER GLUCOSE: 134 MG/DL (ref 74–99)
METER GLUCOSE: 137 MG/DL (ref 74–99)
METER GLUCOSE: 140 MG/DL (ref 74–99)
METER GLUCOSE: 148 MG/DL (ref 74–99)
METER GLUCOSE: 150 MG/DL (ref 74–99)
METER GLUCOSE: 154 MG/DL (ref 74–99)
METER GLUCOSE: 155 MG/DL (ref 74–99)
METER GLUCOSE: 167 MG/DL (ref 74–99)
METER GLUCOSE: 190 MG/DL (ref 74–99)
METER GLUCOSE: 191 MG/DL (ref 74–99)
METER GLUCOSE: 204 MG/DL (ref 74–99)
METER GLUCOSE: 92 MG/DL (ref 74–99)
MONOCYTES ABSOLUTE: 0.53 E9/L (ref 0.1–0.95)
MONOCYTES ABSOLUTE: 0.9 E9/L (ref 0.1–0.95)
MONOCYTES ABSOLUTE: 1.11 E9/L (ref 0.1–0.95)
MONOCYTES ABSOLUTE: 1.11 E9/L (ref 0.1–0.95)
MONOCYTES RELATIVE PERCENT: 2.6 % (ref 2–12)
MONOCYTES RELATIVE PERCENT: 5.1 % (ref 2–12)
MONOCYTES RELATIVE PERCENT: 7.2 % (ref 2–12)
MONOCYTES RELATIVE PERCENT: 9.1 % (ref 2–12)
NEUTROPHILS ABSOLUTE: 10.41 E9/L (ref 1.8–7.3)
NEUTROPHILS ABSOLUTE: 13.62 E9/L (ref 1.8–7.3)
NEUTROPHILS ABSOLUTE: 16.33 E9/L (ref 1.8–7.3)
NEUTROPHILS ABSOLUTE: 17.27 E9/L (ref 1.8–7.3)
NEUTROPHILS RELATIVE PERCENT: 85.4 % (ref 43–80)
NEUTROPHILS RELATIVE PERCENT: 88.1 % (ref 43–80)
NEUTROPHILS RELATIVE PERCENT: 92 % (ref 43–80)
NEUTROPHILS RELATIVE PERCENT: 96.5 % (ref 43–80)
NITRITE, URINE: POSITIVE
ORGANISM: ABNORMAL
OVALOCYTES: ABNORMAL
OVALOCYTES: ABNORMAL
PDW BLD-RTO: 16 FL (ref 11.5–15)
PDW BLD-RTO: 16.3 FL (ref 11.5–15)
PDW BLD-RTO: 16.9 FL (ref 11.5–15)
PDW BLD-RTO: 17.2 FL (ref 11.5–15)
PDW BLD-RTO: 17.4 FL (ref 11.5–15)
PH UA: 5.5 (ref 5–9)
PLATELET # BLD: 216 E9/L (ref 130–450)
PLATELET # BLD: 223 E9/L (ref 130–450)
PLATELET # BLD: 248 E9/L (ref 130–450)
PLATELET # BLD: 250 E9/L (ref 130–450)
PLATELET # BLD: 267 E9/L (ref 130–450)
PMV BLD AUTO: 10.3 FL (ref 7–12)
PMV BLD AUTO: 9.3 FL (ref 7–12)
PMV BLD AUTO: 9.5 FL (ref 7–12)
PMV BLD AUTO: 9.9 FL (ref 7–12)
PMV BLD AUTO: 9.9 FL (ref 7–12)
POIKILOCYTES: ABNORMAL
POTASSIUM REFLEX MAGNESIUM: 3.2 MMOL/L (ref 3.5–5)
POTASSIUM REFLEX MAGNESIUM: 3.4 MMOL/L (ref 3.5–5)
POTASSIUM SERPL-SCNC: 3.2 MMOL/L (ref 3.5–5)
POTASSIUM SERPL-SCNC: 3.4 MMOL/L (ref 3.5–5)
PRO-BNP: 5835 PG/ML (ref 0–450)
PROTEIN UA: 30 MG/DL
PROTHROMBIN TIME: 18.4 SEC (ref 9.3–12.4)
RBC # BLD: 4.1 E12/L (ref 3.8–5.8)
RBC # BLD: 4.12 E12/L (ref 3.8–5.8)
RBC # BLD: 4.38 E12/L (ref 3.8–5.8)
RBC # BLD: 4.38 E12/L (ref 3.8–5.8)
RBC # BLD: 4.48 E12/L (ref 3.8–5.8)
RBC UA: ABNORMAL /HPF (ref 0–2)
SODIUM BLD-SCNC: 142 MMOL/L (ref 132–146)
SODIUM BLD-SCNC: 150 MMOL/L (ref 132–146)
SODIUM BLD-SCNC: 151 MMOL/L (ref 132–146)
SODIUM BLD-SCNC: 158 MMOL/L (ref 132–146)
SPECIFIC GRAVITY UA: 1.02 (ref 1–1.03)
TOTAL PROTEIN: 6.1 G/DL (ref 6.4–8.3)
TROPONIN: 0.08 NG/ML (ref 0–0.03)
URINE CULTURE, ROUTINE: NORMAL
UROBILINOGEN, URINE: 0.2 E.U./DL
WBC # BLD: 12.2 E9/L (ref 4.5–11.5)
WBC # BLD: 14.4 E9/L (ref 4.5–11.5)
WBC # BLD: 15.5 E9/L (ref 4.5–11.5)
WBC # BLD: 17.8 E9/L (ref 4.5–11.5)
WBC # BLD: 17.8 E9/L (ref 4.5–11.5)
WBC UA: ABNORMAL /HPF (ref 0–5)

## 2019-01-01 PROCEDURE — 36569 INSJ PICC 5 YR+ W/O IMAGING: CPT

## 2019-01-01 PROCEDURE — 94660 CPAP INITIATION&MGMT: CPT

## 2019-01-01 PROCEDURE — 2580000003 HC RX 258: Performed by: FAMILY MEDICINE

## 2019-01-01 PROCEDURE — 6360000002 HC RX W HCPCS: Performed by: INTERNAL MEDICINE

## 2019-01-01 PROCEDURE — 83735 ASSAY OF MAGNESIUM: CPT

## 2019-01-01 PROCEDURE — 99283 EMERGENCY DEPT VISIT LOW MDM: CPT

## 2019-01-01 PROCEDURE — 0T2BX0Z CHANGE DRAINAGE DEVICE IN BLADDER, EXTERNAL APPROACH: ICD-10-PCS | Performed by: INTERNAL MEDICINE

## 2019-01-01 PROCEDURE — 81001 URINALYSIS AUTO W/SCOPE: CPT

## 2019-01-01 PROCEDURE — 85025 COMPLETE CBC W/AUTO DIFF WBC: CPT

## 2019-01-01 PROCEDURE — 80053 COMPREHEN METABOLIC PANEL: CPT

## 2019-01-01 PROCEDURE — C1751 CATH, INF, PER/CENT/MIDLINE: HCPCS

## 2019-01-01 PROCEDURE — 36415 COLL VENOUS BLD VENIPUNCTURE: CPT

## 2019-01-01 PROCEDURE — 82962 GLUCOSE BLOOD TEST: CPT

## 2019-01-01 PROCEDURE — 6360000002 HC RX W HCPCS: Performed by: FAMILY MEDICINE

## 2019-01-01 PROCEDURE — 80048 BASIC METABOLIC PNL TOTAL CA: CPT

## 2019-01-01 PROCEDURE — 71045 X-RAY EXAM CHEST 1 VIEW: CPT

## 2019-01-01 PROCEDURE — 83605 ASSAY OF LACTIC ACID: CPT

## 2019-01-01 PROCEDURE — 6370000000 HC RX 637 (ALT 250 FOR IP): Performed by: FAMILY MEDICINE

## 2019-01-01 PROCEDURE — 6370000000 HC RX 637 (ALT 250 FOR IP): Performed by: NURSE PRACTITIONER

## 2019-01-01 PROCEDURE — 93005 ELECTROCARDIOGRAM TRACING: CPT | Performed by: EMERGENCY MEDICINE

## 2019-01-01 PROCEDURE — 2580000003 HC RX 258: Performed by: INTERNAL MEDICINE

## 2019-01-01 PROCEDURE — 85610 PROTHROMBIN TIME: CPT

## 2019-01-01 PROCEDURE — 2580000003 HC RX 258: Performed by: EMERGENCY MEDICINE

## 2019-01-01 PROCEDURE — 87077 CULTURE AEROBIC IDENTIFY: CPT

## 2019-01-01 PROCEDURE — 99285 EMERGENCY DEPT VISIT HI MDM: CPT

## 2019-01-01 PROCEDURE — 74176 CT ABD & PELVIS W/O CONTRAST: CPT

## 2019-01-01 PROCEDURE — 99232 SBSQ HOSP IP/OBS MODERATE 35: CPT | Performed by: NURSE PRACTITIONER

## 2019-01-01 PROCEDURE — 85027 COMPLETE CBC AUTOMATED: CPT

## 2019-01-01 PROCEDURE — 1200000000 HC SEMI PRIVATE

## 2019-01-01 PROCEDURE — 6360000002 HC RX W HCPCS: Performed by: NURSE PRACTITIONER

## 2019-01-01 PROCEDURE — 96372 THER/PROPH/DIAG INJ SC/IM: CPT

## 2019-01-01 PROCEDURE — 6360000002 HC RX W HCPCS: Performed by: EMERGENCY MEDICINE

## 2019-01-01 PROCEDURE — 99222 1ST HOSP IP/OBS MODERATE 55: CPT | Performed by: NURSE PRACTITIONER

## 2019-01-01 PROCEDURE — 76937 US GUIDE VASCULAR ACCESS: CPT

## 2019-01-01 PROCEDURE — 87040 BLOOD CULTURE FOR BACTERIA: CPT

## 2019-01-01 PROCEDURE — 83880 ASSAY OF NATRIURETIC PEPTIDE: CPT

## 2019-01-01 PROCEDURE — 2700000000 HC OXYGEN THERAPY PER DAY

## 2019-01-01 PROCEDURE — 96365 THER/PROPH/DIAG IV INF INIT: CPT

## 2019-01-01 PROCEDURE — 6360000002 HC RX W HCPCS

## 2019-01-01 PROCEDURE — 84484 ASSAY OF TROPONIN QUANT: CPT

## 2019-01-01 PROCEDURE — 87088 URINE BACTERIA CULTURE: CPT

## 2019-01-01 PROCEDURE — 87186 SC STD MICRODIL/AGAR DIL: CPT

## 2019-01-01 PROCEDURE — 2060000000 HC ICU INTERMEDIATE R&B

## 2019-01-01 PROCEDURE — 94640 AIRWAY INHALATION TREATMENT: CPT

## 2019-01-01 PROCEDURE — 6370000000 HC RX 637 (ALT 250 FOR IP): Performed by: EMERGENCY MEDICINE

## 2019-01-01 RX ORDER — LANOLIN ALCOHOL/MO/W.PET/CERES
400 CREAM (GRAM) TOPICAL DAILY
Status: DISCONTINUED | OUTPATIENT
Start: 2019-01-01 | End: 2019-01-01

## 2019-01-01 RX ORDER — PANTOPRAZOLE SODIUM 40 MG/1
40 TABLET, DELAYED RELEASE ORAL DAILY
Status: DISCONTINUED | OUTPATIENT
Start: 2019-01-01 | End: 2019-01-01

## 2019-01-01 RX ORDER — INSULIN GLARGINE 100 [IU]/ML
10 INJECTION, SOLUTION SUBCUTANEOUS NIGHTLY
Status: DISCONTINUED | OUTPATIENT
Start: 2019-01-01 | End: 2019-01-01

## 2019-01-01 RX ORDER — ATROPA BELLADONNA AND OPIUM 16.2; 6 MG/1; MG/1
60 SUPPOSITORY RECTAL EVERY 8 HOURS PRN
Status: DISCONTINUED | OUTPATIENT
Start: 2019-01-01 | End: 2019-01-01 | Stop reason: HOSPADM

## 2019-01-01 RX ORDER — SPIRONOLACTONE 25 MG/1
50 TABLET ORAL DAILY
Status: DISCONTINUED | OUTPATIENT
Start: 2019-01-01 | End: 2019-01-01

## 2019-01-01 RX ORDER — MIRTAZAPINE 15 MG/1
15 TABLET, FILM COATED ORAL NIGHTLY
Status: DISCONTINUED | OUTPATIENT
Start: 2019-01-01 | End: 2019-01-01

## 2019-01-01 RX ORDER — 0.9 % SODIUM CHLORIDE 0.9 %
500 INTRAVENOUS SOLUTION INTRAVENOUS ONCE
Status: COMPLETED | OUTPATIENT
Start: 2019-01-01 | End: 2019-01-01

## 2019-01-01 RX ORDER — NICOTINE POLACRILEX 4 MG
15 LOZENGE BUCCAL PRN
Status: DISCONTINUED | OUTPATIENT
Start: 2019-01-01 | End: 2019-01-01 | Stop reason: HOSPADM

## 2019-01-01 RX ORDER — DONEPEZIL HYDROCHLORIDE 5 MG/1
10 TABLET, FILM COATED ORAL NIGHTLY
Status: DISCONTINUED | OUTPATIENT
Start: 2019-01-01 | End: 2019-01-01

## 2019-01-01 RX ORDER — DULOXETIN HYDROCHLORIDE 60 MG/1
60 CAPSULE, DELAYED RELEASE ORAL DAILY
Status: DISCONTINUED | OUTPATIENT
Start: 2019-01-01 | End: 2019-01-01

## 2019-01-01 RX ORDER — DIVALPROEX SODIUM 500 MG/1
1000 TABLET, EXTENDED RELEASE ORAL 3 TIMES DAILY
Status: DISCONTINUED | OUTPATIENT
Start: 2019-01-01 | End: 2019-01-01

## 2019-01-01 RX ORDER — FUROSEMIDE 20 MG/1
20 TABLET ORAL 2 TIMES DAILY
Status: ON HOLD | COMMUNITY
End: 2019-01-01 | Stop reason: HOSPADM

## 2019-01-01 RX ORDER — POTASSIUM CHLORIDE 20 MEQ/1
20 TABLET, EXTENDED RELEASE ORAL EVERY OTHER DAY
Status: DISCONTINUED | OUTPATIENT
Start: 2019-01-01 | End: 2019-01-01

## 2019-01-01 RX ORDER — IPRATROPIUM BROMIDE AND ALBUTEROL SULFATE 2.5; .5 MG/3ML; MG/3ML
1 SOLUTION RESPIRATORY (INHALATION)
Status: COMPLETED | OUTPATIENT
Start: 2019-01-01 | End: 2019-01-01

## 2019-01-01 RX ORDER — MORPHINE SULFATE 20 MG/ML
5 SOLUTION ORAL EVERY 6 HOURS PRN
Qty: 4 ML | Refills: 0 | Status: SHIPPED | OUTPATIENT
Start: 2019-01-01 | End: 2019-01-01

## 2019-01-01 RX ORDER — SODIUM CHLORIDE 0.9 % (FLUSH) 0.9 %
10 SYRINGE (ML) INJECTION EVERY 12 HOURS SCHEDULED
Status: DISCONTINUED | OUTPATIENT
Start: 2019-01-01 | End: 2019-01-01

## 2019-01-01 RX ORDER — DOCUSATE SODIUM 100 MG/1
100 CAPSULE, LIQUID FILLED ORAL 2 TIMES DAILY PRN
Status: DISCONTINUED | OUTPATIENT
Start: 2019-01-01 | End: 2019-01-01

## 2019-01-01 RX ORDER — CHOLECALCIFEROL (VITAMIN D3) 1250 MCG
CAPSULE ORAL WEEKLY
Status: ON HOLD | COMMUNITY
End: 2019-01-01 | Stop reason: HOSPADM

## 2019-01-01 RX ORDER — ATROPA BELLADONNA AND OPIUM 16.2; 6 MG/1; MG/1
60 SUPPOSITORY RECTAL EVERY 8 HOURS PRN
Qty: 5 SUPPOSITORY | Refills: 0 | Status: SHIPPED | OUTPATIENT
Start: 2019-01-01 | End: 2019-01-01

## 2019-01-01 RX ORDER — LOPERAMIDE HYDROCHLORIDE 2 MG/1
2 CAPSULE ORAL 4 TIMES DAILY PRN
Status: ON HOLD | COMMUNITY
End: 2019-01-01 | Stop reason: HOSPADM

## 2019-01-01 RX ORDER — BISACODYL 10 MG
10 SUPPOSITORY, RECTAL RECTAL DAILY PRN
Status: ON HOLD | COMMUNITY
End: 2019-01-01 | Stop reason: HOSPADM

## 2019-01-01 RX ORDER — LORAZEPAM 2 MG/ML
1 INJECTION INTRAMUSCULAR EVERY 6 HOURS PRN
Status: DISCONTINUED | OUTPATIENT
Start: 2019-01-01 | End: 2019-01-01

## 2019-01-01 RX ORDER — SODIUM CHLORIDE 0.9 % (FLUSH) 0.9 %
10 SYRINGE (ML) INJECTION PRN
Status: DISCONTINUED | OUTPATIENT
Start: 2019-01-01 | End: 2019-01-01 | Stop reason: HOSPADM

## 2019-01-01 RX ORDER — SODIUM CHLORIDE 9 MG/ML
INJECTION, SOLUTION INTRAVENOUS CONTINUOUS
Status: DISCONTINUED | OUTPATIENT
Start: 2019-01-01 | End: 2019-01-01

## 2019-01-01 RX ORDER — DOCUSATE SODIUM 100 MG/1
100 CAPSULE, LIQUID FILLED ORAL 2 TIMES DAILY PRN
Status: ON HOLD | COMMUNITY
End: 2019-01-01 | Stop reason: HOSPADM

## 2019-01-01 RX ORDER — LORAZEPAM 2 MG/ML
1 CONCENTRATE ORAL EVERY 6 HOURS PRN
Qty: 6 ML | Refills: 0 | Status: SHIPPED | OUTPATIENT
Start: 2019-01-01 | End: 2019-01-01

## 2019-01-01 RX ORDER — FOLIC ACID 1 MG/1
1 TABLET ORAL DAILY
Status: DISCONTINUED | OUTPATIENT
Start: 2019-01-01 | End: 2019-01-01

## 2019-01-01 RX ORDER — CHOLECALCIFEROL (VITAMIN D3) 50 MCG
2000 TABLET ORAL DAILY
Status: DISCONTINUED | OUTPATIENT
Start: 2019-01-01 | End: 2019-01-01

## 2019-01-01 RX ORDER — THIAMINE MONONITRATE (VIT B1) 100 MG
100 TABLET ORAL DAILY
Status: DISCONTINUED | OUTPATIENT
Start: 2019-01-01 | End: 2019-01-01

## 2019-01-01 RX ORDER — ACETAMINOPHEN 325 MG/1
650 TABLET ORAL EVERY 4 HOURS PRN
Status: DISCONTINUED | OUTPATIENT
Start: 2019-01-01 | End: 2019-01-01

## 2019-01-01 RX ORDER — CEFDINIR 300 MG/1
300 CAPSULE ORAL EVERY 12 HOURS SCHEDULED
Status: DISCONTINUED | OUTPATIENT
Start: 2019-01-01 | End: 2019-01-01 | Stop reason: HOSPADM

## 2019-01-01 RX ORDER — BISACODYL 10 MG
10 SUPPOSITORY, RECTAL RECTAL DAILY PRN
Status: DISCONTINUED | OUTPATIENT
Start: 2019-01-01 | End: 2019-01-01 | Stop reason: HOSPADM

## 2019-01-01 RX ORDER — POTASSIUM CHLORIDE AND SODIUM CHLORIDE 450; 150 MG/100ML; MG/100ML
INJECTION, SOLUTION INTRAVENOUS CONTINUOUS
Status: DISCONTINUED | OUTPATIENT
Start: 2019-01-01 | End: 2019-01-01

## 2019-01-01 RX ORDER — DEXTROSE MONOHYDRATE 50 MG/ML
100 INJECTION, SOLUTION INTRAVENOUS PRN
Status: DISCONTINUED | OUTPATIENT
Start: 2019-01-01 | End: 2019-01-01 | Stop reason: HOSPADM

## 2019-01-01 RX ORDER — DEXTROSE MONOHYDRATE 25 G/50ML
12.5 INJECTION, SOLUTION INTRAVENOUS PRN
Status: DISCONTINUED | OUTPATIENT
Start: 2019-01-01 | End: 2019-01-01 | Stop reason: HOSPADM

## 2019-01-01 RX ORDER — SODIUM CHLORIDE 9 MG/ML
INJECTION, SOLUTION INTRAVENOUS ONCE
Status: COMPLETED | OUTPATIENT
Start: 2019-01-01 | End: 2019-01-01

## 2019-01-01 RX ORDER — FERROUS SULFATE 325(65) MG
325 TABLET ORAL 2 TIMES DAILY WITH MEALS
Status: DISCONTINUED | OUTPATIENT
Start: 2019-01-01 | End: 2019-01-01

## 2019-01-01 RX ORDER — MINERAL OIL/HYDROPHIL PETROLAT
OINTMENT (GRAM) TOPICAL
Status: ON HOLD | COMMUNITY
End: 2019-01-01 | Stop reason: HOSPADM

## 2019-01-01 RX ORDER — CHOLESTYRAMINE 4 G/9G
1 POWDER, FOR SUSPENSION ORAL 2 TIMES DAILY PRN
Status: ON HOLD | COMMUNITY
End: 2019-01-01 | Stop reason: HOSPADM

## 2019-01-01 RX ORDER — MORPHINE SULFATE 20 MG/ML
5 SOLUTION ORAL EVERY 6 HOURS PRN
Status: DISCONTINUED | OUTPATIENT
Start: 2019-01-01 | End: 2019-01-01 | Stop reason: HOSPADM

## 2019-01-01 RX ORDER — SENNA PLUS 8.6 MG/1
1 TABLET ORAL 2 TIMES DAILY PRN
Status: ON HOLD | COMMUNITY
End: 2019-01-01 | Stop reason: HOSPADM

## 2019-01-01 RX ORDER — LORAZEPAM 2 MG/ML
INJECTION INTRAMUSCULAR
Status: COMPLETED
Start: 2019-01-01 | End: 2019-01-01

## 2019-01-01 RX ORDER — ACETAMINOPHEN 650 MG/1
650 SUPPOSITORY RECTAL EVERY 4 HOURS PRN
Status: DISCONTINUED | OUTPATIENT
Start: 2019-01-01 | End: 2019-01-01 | Stop reason: HOSPADM

## 2019-01-01 RX ORDER — POTASSIUM CHLORIDE 20 MEQ/1
20 TABLET, EXTENDED RELEASE ORAL ONCE
Status: DISCONTINUED | OUTPATIENT
Start: 2019-01-01 | End: 2019-01-01 | Stop reason: HOSPADM

## 2019-01-01 RX ORDER — MIRTAZAPINE 15 MG/1
15 TABLET, FILM COATED ORAL NIGHTLY
Status: ON HOLD | COMMUNITY
End: 2019-01-01 | Stop reason: HOSPADM

## 2019-01-01 RX ORDER — LORAZEPAM 2 MG/ML
1 CONCENTRATE ORAL EVERY 6 HOURS PRN
Status: DISCONTINUED | OUTPATIENT
Start: 2019-01-01 | End: 2019-01-01 | Stop reason: HOSPADM

## 2019-01-01 RX ORDER — SODIUM CHLORIDE AND POTASSIUM CHLORIDE .9; .15 G/100ML; G/100ML
SOLUTION INTRAVENOUS CONTINUOUS
Status: DISCONTINUED | OUTPATIENT
Start: 2019-01-01 | End: 2019-01-01

## 2019-01-01 RX ORDER — IPRATROPIUM BROMIDE AND ALBUTEROL SULFATE 2.5; .5 MG/3ML; MG/3ML
3 SOLUTION RESPIRATORY (INHALATION)
Qty: 360 ML | DISCHARGE
Start: 2019-01-01

## 2019-01-01 RX ORDER — ASPIRIN 81 MG/1
81 TABLET ORAL DAILY
Status: DISCONTINUED | OUTPATIENT
Start: 2019-01-01 | End: 2019-01-01

## 2019-01-01 RX ORDER — DULOXETIN HYDROCHLORIDE 60 MG/1
60 CAPSULE, DELAYED RELEASE ORAL DAILY
Status: ON HOLD | COMMUNITY
End: 2019-01-01 | Stop reason: HOSPADM

## 2019-01-01 RX ORDER — LORAZEPAM 2 MG/ML
2 INJECTION INTRAMUSCULAR ONCE
Status: COMPLETED | OUTPATIENT
Start: 2019-01-01 | End: 2019-01-01

## 2019-01-01 RX ORDER — CEFDINIR 300 MG/1
300 CAPSULE ORAL EVERY 12 HOURS SCHEDULED
Qty: 20 CAPSULE | Refills: 0 | DISCHARGE
Start: 2019-01-01 | End: 2019-04-20

## 2019-01-01 RX ADMIN — INSULIN GLARGINE 10 UNITS: 100 INJECTION, SOLUTION SUBCUTANEOUS at 20:53

## 2019-01-01 RX ADMIN — CEFTRIAXONE SODIUM 1 G: 1 INJECTION, POWDER, FOR SOLUTION INTRAMUSCULAR; INTRAVENOUS at 00:35

## 2019-01-01 RX ADMIN — MEROPENEM 1 G: 1 INJECTION, POWDER, FOR SOLUTION INTRAVENOUS at 17:46

## 2019-01-01 RX ADMIN — SODIUM CHLORIDE 500 ML: 9 INJECTION, SOLUTION INTRAVENOUS at 01:10

## 2019-01-01 RX ADMIN — HYDROCORTISONE SODIUM SUCCINATE 50 MG: 100 INJECTION, POWDER, FOR SOLUTION INTRAMUSCULAR; INTRAVENOUS at 05:13

## 2019-01-01 RX ADMIN — LORAZEPAM 2 MG: 2 INJECTION INTRAMUSCULAR at 01:32

## 2019-01-01 RX ADMIN — SODIUM CHLORIDE AND POTASSIUM CHLORIDE: .9; .15 SOLUTION INTRAVENOUS at 14:41

## 2019-01-01 RX ADMIN — CEFTRIAXONE SODIUM 1 G: 1 INJECTION, POWDER, FOR SOLUTION INTRAMUSCULAR; INTRAVENOUS at 23:49

## 2019-01-01 RX ADMIN — Medication 10 ML: at 10:38

## 2019-01-01 RX ADMIN — Medication 10 ML: at 05:14

## 2019-01-01 RX ADMIN — MEROPENEM 1 G: 1 INJECTION, POWDER, FOR SOLUTION INTRAVENOUS at 02:10

## 2019-01-01 RX ADMIN — SODIUM CHLORIDE: 9 INJECTION, SOLUTION INTRAVENOUS at 08:42

## 2019-01-01 RX ADMIN — DIVALPROEX SODIUM 1000 MG: 500 TABLET, EXTENDED RELEASE ORAL at 14:19

## 2019-01-01 RX ADMIN — WATER 2 G: 1 INJECTION INTRAMUSCULAR; INTRAVENOUS; SUBCUTANEOUS at 02:02

## 2019-01-01 RX ADMIN — IPRATROPIUM BROMIDE AND ALBUTEROL SULFATE 1 AMPULE: .5; 3 SOLUTION RESPIRATORY (INHALATION) at 20:16

## 2019-01-01 RX ADMIN — Medication 10 ML: at 14:38

## 2019-01-01 RX ADMIN — POTASSIUM CHLORIDE AND SODIUM CHLORIDE: 450; 150 INJECTION, SOLUTION INTRAVENOUS at 05:14

## 2019-01-01 RX ADMIN — ATROPA BELLADONNA AND OPIUM 60 MG: 16.2; 6 SUPPOSITORY RECTAL at 16:12

## 2019-01-01 RX ADMIN — LORAZEPAM 2 MG: 2 INJECTION, SOLUTION INTRAMUSCULAR; INTRAVENOUS at 01:32

## 2019-01-01 RX ADMIN — LORAZEPAM 1 MG: 2 INJECTION, SOLUTION INTRAMUSCULAR; INTRAVENOUS at 21:24

## 2019-01-01 RX ADMIN — INSULIN LISPRO 4 UNITS: 100 INJECTION, SOLUTION INTRAVENOUS; SUBCUTANEOUS at 20:53

## 2019-01-01 RX ADMIN — SODIUM CHLORIDE: 9 INJECTION, SOLUTION INTRAVENOUS at 05:14

## 2019-01-01 RX ADMIN — IPRATROPIUM BROMIDE AND ALBUTEROL SULFATE 1 AMPULE: .5; 3 SOLUTION RESPIRATORY (INHALATION) at 20:40

## 2019-01-01 RX ADMIN — MEROPENEM 1 G: 1 INJECTION, POWDER, FOR SOLUTION INTRAVENOUS at 10:38

## 2019-01-01 RX ADMIN — Medication 10 ML: at 02:04

## 2019-01-01 RX ADMIN — MEROPENEM 1 G: 1 INJECTION, POWDER, FOR SOLUTION INTRAVENOUS at 12:55

## 2019-01-01 RX ADMIN — MEROPENEM 1 G: 1 INJECTION, POWDER, FOR SOLUTION INTRAVENOUS at 17:41

## 2019-01-01 RX ADMIN — IPRATROPIUM BROMIDE AND ALBUTEROL SULFATE 1 AMPULE: .5; 3 SOLUTION RESPIRATORY (INHALATION) at 20:30

## 2019-01-01 ASSESSMENT — PAIN SCALES - PAIN ASSESSMENT IN ADVANCED DEMENTIA (PAINAD)
TOTALSCORE: 0
NEGVOCALIZATION: 0
FACIALEXPRESSION: 0
BODYLANGUAGE: 0
TOTALSCORE: 0
BREATHING: 0
BREATHING: 0
CONSOLABILITY: 0
CONSOLABILITY: 0
NEGVOCALIZATION: 0
FACIALEXPRESSION: 0
NEGVOCALIZATION: 0
CONSOLABILITY: 0
FACIALEXPRESSION: 0
BODYLANGUAGE: 0
BODYLANGUAGE: 0
TOTALSCORE: 0
NEGVOCALIZATION: 0
FACIALEXPRESSION: 0
BODYLANGUAGE: 0
CONSOLABILITY: 0
BREATHING: 0
BREATHING: 0
NEGVOCALIZATION: 0
BREATHING: 0
CONSOLABILITY: 0
FACIALEXPRESSION: 0
TOTALSCORE: 0
BODYLANGUAGE: 0
BODYLANGUAGE: 0
TOTALSCORE: 0
BREATHING: 0
CONSOLABILITY: 0
TOTALSCORE: 0
NEGVOCALIZATION: 0
FACIALEXPRESSION: 0

## 2019-01-01 ASSESSMENT — PAIN SCALES - GENERAL
PAINLEVEL_OUTOF10: 0

## 2019-04-05 NOTE — ED PROVIDER NOTES
HPI:  4/5/19, Time: 7:40 PM         Joaquin Anne is a [de-identified] y.o. male presenting to the ED for sob, beginning 1 day ago. The complaint has been persistent, severe in severity, and worsened by nothing. Patient unable to give hx . There is no vomiting or diarrhea. There is hx of copd and dvt and diabetes. Family reports he has not been eating well  And drinking  Well. Patient is communicative as he is normally    ROS:   Pertinent positives and negatives are stated within HPI, all other systems reviewed and are negative.  --------------------------------------------- PAST HISTORY ---------------------------------------------  Past Medical History:  has a past medical history of Anemia, Arthritis, CHF (congestive heart failure) ([address] Chronic back pain, Chronic deep vein thrombosis (DVT) of femoral vein of right lower extremity (HCC), COPD (chronic obstructive pulmonary disease) ([address] Deep vein thrombosis ([address] Degenerative joint disease, Diabetes mellitus ([address] GERD (gastroesophageal reflux disease), H/O chronic prostatitis, Hypercoagulable state ([address] Hypertension, Lymphedema of both lower extremities, Mitral valve insufficiency, Neurogenic bladder, Post-thrombotic syndrome of right lower extremity, Renal calculus, S/P IVC filter, Thoracic aortic aneurysm ([address] Unspecified cirrhosis of liver ([address] and Venous insufficiency of both lower extremities. Past Surgical History:  has a past surgical history that includes Foot surgery (Bilateral); Cholecystectomy; Tonsillectomy; Dental surgery; bronchoscopy (4/8/15); Vena Cava Filter Placement (10/07/2015); eye surgery; Colonoscopy; other surgical history (10/15/15); Cystocopy (08/16/2016); and pr aspiration bladder insert suprapubic catheter (N/A, 3/15/2018). Social History:  reports that he quit smoking about 39 years ago. His smoking use included cigarettes. He has a 60.00 pack-year smoking history.  He has never used smokeless tobacco. He reports that he does nursing notes within the ED encounter and vital signs as below have been reviewed by myself. /69   Pulse 103   Temp 99.3 °F (37.4 °C)   Resp 26   SpO2 100%   Oxygen Saturation Interpretation: Normal    The patients available past medical records and past encounters were reviewed. ------------------------------ ED COURSE/MEDICAL DECISION MAKING----------------------  Medications   0.9 % sodium chloride bolus (has no administration in time range)   ipratropium-albuterol (DUONEB) nebulizer solution 1 ampule (1 ampule Inhalation Given 4/5/19 2040)   cefTRIAXone (ROCEPHIN) 1 g in dextrose 5 % 50 mL IVPB (vial-mate) (0 g Intravenous Stopped 4/6/19 0020)             Medical Decision Making:        Re-Evaluations:             Re-evaluation. Patients symptoms show no change  Patient's blood pressure noted to be low given fluid bolus patient placed on BiPAP and family made aware of findings patient is DNR CC  Patient's blood pressure did improve patient medicated. Patient given IV Rocephin  Rechecked and blood pressure has improved and I spoke to family and again no heroic measures. And discussed IV pressors and agreed that only IV fluids to be given  Consultations:             IM    Critical Care: This patient's ED course included: a personal history and physicial eaxmination    This patient has been closely monitored during their ED course. Counseling: The emergency provider has spoken with the patient and discussed todays results, in addition to providing specific details for the plan of care and counseling regarding the diagnosis and prognosis. Questions are answered at this time and they are agreeable with the plan.       --------------------------------- IMPRESSION AND DISPOSITION ---------------------------------    IMPRESSION  1. SOB (shortness of breath)    2.  Septicemia ([facility]        DISPOSITION  Disposition: Admit to general floor  Patient condition is stable        NOTE: This report was transcribed using voice recognition software.  Every effort was made to ensure accuracy; however, inadvertent computerized transcription errors may be present          Ivis Marinelli MD  04/05/19 0570            Ivis Marinelli MD  04/06/19 [id number]

## 2019-04-06 PROBLEM — A41.9 SEVERE SEPSIS (HCC): Status: ACTIVE | Noted: 2019-01-01

## 2019-04-06 PROBLEM — R65.20 SEVERE SEPSIS (HCC): Status: ACTIVE | Noted: 2019-01-01

## 2019-04-06 NOTE — CONSULTS
Palliative Care Department  Palliative Care Initial Consult  Provider: [address] [place] [address] Day: 2    Referring Provider:  TALA Nazario CNP    Reason for Consult:  [x]  Code status Discussion  [x]  Assist with goals of care  []  Psychosocial support  []  Symptom Management  []  Advanced Care Planning    Chief Complaint: Miki Johns is a [de-identified] y.o. male with chief complaint of sob    Assessment/Plan      Active Hospital Problems    Diagnosis Date Noted    Severe sepsis ([facility] [address] [A41.9, R65.20] 04/06/2019   alzheimer's,  Probable UTI  neurogenic bladder    Palliative Care Encounter/Recommendations:      - Goals of care: provide comfort care/support/palliation/relieve suffering     - Code Status: DNR-CC     - Support given    -consult hospice       Subjective:     HPI:  Miki Johns is a [de-identified] y.o. male with significant past medical history of CHF, chronic DVTs status post IVC filter, COPD, alzheimer's, diabetes, hypertension, neurogenic bladder with supra pubic cath in place who presented with sob nursing home. He has also had poor appetite as well. Per attending note advanced care planning family wanted to focus on comfort care only. He will not be placed back on bipap. Subjective/Events/Discussions:  Patient yells out, has confusion. He does not verbalizes but nods at times but unsure if he understands. Spoke with son. Patient has been declining over last few months. He has become aggressive and will yell and throw things. He has not been eating and had significant weight loss of 40 lbs. He has not been eating. His son wants hospice care. Benefit explained.  He chooses [facility].    - Family Meeting:   Participants:Child   Family meeting was held to discuss:diagnosis, prognosis, treatment options, goals of care, prior expressed wishes, advanced care planning and discharge plan     Past Medical History:   Diagnosis Date    Anemia     Arthritis     CHF (congestive heart failure) ([facility]     Chronic back pain     Chronic deep vein thrombosis (DVT) of femoral vein of right lower extremity ([address] 3/9/2018    COPD (chronic obstructive pulmonary disease) (HCC)     Deep vein thrombosis ([address] 4/2001    Right popliteal/right femoral vein. Patient is on chronic Coumadin therapy. 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 Patient had been evaluated by Dr. Isatu Corbett and was advised knee-high compression stockings.  Degenerative joint disease     Both knees.  Diabetes mellitus ([address]     GERD (gastroesophageal reflux disease)     H/O chronic prostatitis     S/P prostate biopsy in 12/2001.  Hypercoagulable state ([address]     Patient is homozygote for IBAN-1 and MTHFR with decreased anti-thrombiin-3 level.  Hypertension     Lymphedema of both lower extremities 5/28/2016    Mitral valve insufficiency     Neurogenic bladder     Post-thrombotic syndrome of right lower extremity 3/10/2018    Renal calculus     S/P IVC filter 5/28/2016    Thoracic aortic aneurysm (HCC)     Un[facility] cirrhosis [facility]     Venous insufficiency of both lower extremities 3/10/2018       Past Surgical History:   Procedure Laterality Date    BRONCHOSCOPY  4/8/15    CHOLECYSTECTOMY      COLONOSCOPY      CYSTOSCOPY  08/16/2016    retrogrades. stent changed.  DENTAL SURGERY      Full mouth extraction.  EYE SURGERY      JUAN C LENSE IMPLANTS    FOOT SURGERY Bilateral     Correction of bone abnormality.  OTHER SURGICAL HISTORY  10/15/15    right ESWL    CT ASPIRATION BLADDER INSERT SUPRAPUBIC CATHETER N/A 3/15/2018    CYSTOSCOPY SUPRAPUBIC TUBE PLACEMENT performed by Murray Candelaria MD at [address] [place] [address]  10/07/2015    Lea Amos       No family history on file. Reviewed. Non contributory    Social History     Socioeconomic History    Marital status:       Spouse name: Not on file    Number of children: 2    Years of education: 15    Highest education level: Not on file   Occupational History    Occupation: mill   Social Needs    Financial resource strain: Not on file    Food insecurity:     Worry: Not on file     Inability: Not on file   [employer] needs:     Medical: Not on file     Non-medical: Not on file   Tobacco Use    Smoking status: Former Smoker     Packs/day: 3.00     Years: 20.00     Pack years: 60.00     Types: Cigarettes     Last attempt to quit: 1980     Years since quittin.2    Smokeless tobacco: Never Used   Substance and Sexual Activity    Alcohol use: No     Alcohol/week: 0.0 oz    Drug use: No    Sexual activity: Never   Lifestyle    Physical activity:     Days per week: Not on file     Minutes per session: Not on file    Stress: Not on file   Relationships    Social connections:     Talks on phone: Not on file     Gets together: Not on file     Attends Restorationism service: Not on file     Active member of club or organization: Not on file     Attends meetings of clubs or organizations: Not on file     Relationship status: Not on file    Intimate partner violence:     Fear of current or ex partner: Not on file     Emotionally abused: Not on file     Physically abused: Not on file     Forced sexual activity: Not on file   Other Topics Concern    Not on file   Social History Narrative    Not on file       Allergies   Allergen Reactions    Sulfa Antibiotics Other (See Comments)     Swelling, SOB       ROS:   UNLESS STATED ABOVE PATIENT DENIES:  CONSTITUTIONAL:  fever, chill, rigors, nausea, vomiting, fatigue. HEENT: blurry vision, double vision, hearing problem, tinnitus, hoarseness, dysphagia, odynophagia  RESPIRATORY: cough, shortness of breath, sputum expectoration. CARDIOVASCULAR:  Chest pain/pressure, palpitation, syncope, irregular beats  GASTROINTESTINAL:  abdominal or rectal pain, diarrhea, constipation, .   GENITOURINARY:  Burning, frequency, urgency, incontinence,   INTEGUMENTARY: rash, wound, pruritis  HEMATOLOGIC/LYMPHATIC:  Swelling, sores, easy bruising,   MUSCULOSKELETAL:  pain, edema, joint swelling or redness  NEUROLOGICAL:  light headed, dizziness, weakness, tremors    Objective:     Physical Exam  /70   Pulse 95   Temp 96.9 °F (36.1 °C) (Temporal)   Resp 20   Ht 5' 9\" (1.753 m)   Wt 187 lb 8 oz (85 kg)   SpO2 95%   BMI 27.69 kg/m²     Gen:  Alert, appears stated age, chronically ill appearing, in no acute distress  HEENT:  Normocephalic, conjunctiva pink, no drainage, mucosa moist  Neck:  Supple  Lungs:  decreased bilaterally, no audible rhonchi or wheezes noted  Heart[de-identified]  RRR, no murmur, rub, or gallop noted during exam  Abd:  Soft, non tender, non distended, BS+   supra pubic cath  Ext:  Moving all extremities, no edema, pulses present  Skin:  Warm and dry  Neuro:  Alert, yells out. Oriented to self. Current Medications:  Inpatient/Home medications reviewed:    Results/Verification of Data Review  Objective data reviewed: labs, images, records, medication use, vitals and chart      - Advanced Directives: HC-POA   -Surrogate/Legal NOK: Child    Contacts:  Servando Sanchez  [phone]    - Spiritual assessment: No spiritual distress identified     - Bereavement and grief: to be determined    - Discharge planning: to be determined    - Prognosis: unknown    - Referrals to: hospice    Time/Communication  Greater than 51% of time spent, total 50 minutes in counseling and coordination of care at the bedside regarding goals of care, symptom management, diagnosis and prognosis and see above. Ubaldo WOOD  Palliative Medicine    Discussed patient and the plan of care with the other IDT members of Palliative Med team and with patient, family and floor nurse. Thank you for allowing Palliative Medicine to participate in the care of Maria Del Rosario Galarza.     Note: This report was completed using computerize voiced

## 2019-04-06 NOTE — ED NOTES
Called [facility] to notify that SBAR has been faxed. Made aware that pt is med/surg admit but is on bipap. Also made aware that pt is DNR-CC. This nurse spoke with Kierra Lim RN.      Santa Gordon RN  04/06/19 [id number]

## 2019-04-06 NOTE — PROGRESS NOTES
ADVANCED CARE PLANNING    Name:Arnold Magallanes       :  1940              MRN:  [id number]      Purpose of Encounter: Advanced care planning in light of respiratory failure and sepsis  Parties in attendance: :TALA Grullon CNP, Family members: Yovana Boyle, son. Decisional Capacity:No, patient not responding to questions or commands on assessment. According to nursing home patient did have decisional capacity prior to arrival and was a DNR CC    Diagnosis: Active Problems:    Severe sepsis ([address]  Resolved Problems:    * No resolved hospital problems. *    Patients Medical Story: See today's H&P for thorough history and situation. Patient was sent from nursing home after several days of poor oral intake and decline as well as shortness of breath per chart. Patient was found to be septic and acute respiratory failure in the ER, placed on BiPAP. Patient was DNR CC from nursing home but family did agree that they wanted patient sent into the hospital.  He was treated with IV fluids and antibiotics, blood cultures and urine cultures sent and he was admitted to the hospital.  Upon assessment, patient does not respond to verbal commands and moans to touch. It appears he is unable to complete bedside swallow evaluation safely and he will be N.p.o. at this time. Upon review of the chart and speaking to the nursing home as well as the son, patient is a DNR CC and we will keep him comfort care without reinstitution of BiPAP or resuscitative measures. Goals of Care Determinations: Patient wishes to focus on comfort care  Plan: Will notify Guillermo Raphael DO of change in care plan. Will look at further interventions as needed. Code Status: At this time patient wishes to be DNR-CC  Time Spent with Patient: 30 minutes    Electronically signed by TALA Mazariegos CNP on 2019 at 11:14 AM  Thank you Guillermo Raphael DO for the opportunity to be involved in this patient's care.

## 2019-04-06 NOTE — ED NOTES
First set of blood cultures drawn from right [facility] using sterile process. Second set of blood cultures draw from left wrist using sterile process.       Alessandro Guthrie, BERYL  04/05/19 [id number]

## 2019-04-06 NOTE — H&P
and that we will not initiate any resuscitative measures and will keep the patient comfortable. Updated on patient's status this morning and patient understands and verbalized continuation of DNR CC status, comfort care only. Past Medical History:          Diagnosis Date    Anemia     Arthritis     CHF (congestive heart failure) (HCC)     Chronic back pain     Chronic deep vein thrombosis (DVT) of femoral vein of right lower extremity (HCC) 3/9/2018    [facility]     Deep vein thrombosis ([facility] 4/2001    Right popliteal/right femoral vein. Patient is on chronic Coumadin therapy. 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 Patient had been evaluated by Dr. Amanda Mtz and was advised knee-high compression stockings.  Degenerative joint disease     Both knees.  Diabetes mellitus ([address]     GERD (gastroesophageal reflux disease)     H/O chronic prostatitis     S/P prostate biopsy in 12/2001.  Hypercoagulable state ([address]     Patient is homozygote for IBAN-1 and MTHFR with decreased anti-thrombiin-3 level.  Hypertension     Lymphedema of both lower extremities 5/28/2016    Mitral valve insufficiency     Neurogenic bladder     Post-thrombotic syndrome of right lower extremity 3/10/2018    Renal calculus     S/P IVC filter 5/28/2016    Thoracic aortic aneurysm (HCC)     Unspecified cirrhosis of liver ([address]     Venous insufficiency of both lower extremities 3/10/2018       Past Surgical History:          Procedure Laterality Date    BRONCHOSCOPY  4/8/15    CHOLECYSTECTOMY      COLONOSCOPY      CYSTOSCOPY  08/16/2016    retrogrades. stent changed.  DENTAL SURGERY      Full mouth extraction.  EYE SURGERY      JUAN C LENSE IMPLANTS    FOOT SURGERY Bilateral     Correction of bone abnormality.     OTHER SURGICAL HISTORY  10/15/15    right ESWL    MT ASPIRATION BLADDER INSERT SUPRAPUBIC CATHETER N/A 3/15/2018    CYSTOSCOPY SUPRAPUBIC TUBE PLACEMENT performed by Pratibha Barron MD at [facility]  10/07/2015    Lea Amos       Medications Prior to Admission:      Prior to Admission medications    Medication Sig Start Date End Date Taking?  Authorizing Provider   DULoxetine (CYMBALTA) 60 MG extended release capsule Take 60 mg by mouth daily   Yes Historical Provider, MD   Dextrose-Sodium Chloride (DEXTROSE 5% AND 0.45% NACL) 5-0.45% bolus Infuse 70 mLs intravenously once Indications: 70mL q shift   Yes Historical Provider, MD   Mineral Oil-Hydrophil Petrolat OINT Apply topically Indications: at bedtime   Yes Historical Provider, MD   mirtazapine (REMERON) 15 MG tablet Take 15 mg by mouth nightly   Yes Historical Provider, MD   Cholecalciferol (VITAMIN D3) 71711 units CAPS Take by mouth once a week Indications: on tuesday   Yes Historical Provider, MD   vitamin D (CHOLECALCIFEROL) 1000 UNIT TABS tablet Take 2,000 Units by mouth daily   Yes Historical Provider, MD   furosemide (LASIX) 20 MG tablet Take 20 mg by mouth 2 times daily Indications: Increase in the Amount of Urine Produced   Yes Historical Provider, MD   insulin glargine (LANTUS) 100 UNIT/ML injection vial Inject 10 Units into the skin nightly   Yes Historical Provider, MD   divalproex (DEPAKOTE ER) 250 MG extended release tablet Take 1 tablet by mouth 2 times daily  Patient taking differently: Take 1,000 mg by mouth 3 times daily  3/16/18  Yes eDny Vasquez, DO   spironolactone (ALDACTONE) 50 MG tablet Take 1 tablet by mouth daily 2/6/18  Yes Josefa Boston,    insulin lispro (HUMALOG) 100 UNIT/ML injection vial Inject 0-12 Units into the skin 3 times daily (with meals) 10/4/17  Yes Carlos Smith, DO   potassium chloride (KLOR-CON M) 20 MEQ extended release tablet Take 1 tablet by mouth every other day 10/4/17  Yes Carlos Smith, DO   pantoprazole (PROTONIX) 40 MG tablet Take Aimee Iyer MD       Allergies:  Sulfa antibiotics    Social History:      The patient currently lives nursing home   TOBACCO:   reports that he quit smoking about 39 years ago. His smoking use included cigarettes. He has a 60.00 pack-year smoking history. He has never used smokeless tobacco.  ETOH:   reports that he does not drink alcohol. REVIEW OF SYSTEMS:   Pertinent positives as noted in the HPI. All other systems reviewed and negative. PHYSICAL EXAM:    /70   Pulse 95   Temp 96.9 °F (36.1 °C) (Temporal)   Resp 20   Ht 5' 9\" (1.753 m)   Wt 187 lb 8 oz (85 kg)   SpO2 95%   BMI 27.69 kg/m²     General appearance:  No apparent distress  HEENT:  Normal cephalic, atraumatic without obvious deformity. Pupils equal, round, and reactive to light. Extra ocular muscles intact. Conjunctivae/corneas clear. Dry mucous membranes   Neck: Supple, with full range of motion. No jugular venous distention. Trachea midline. Respiratory:  Normal respiratory effort. Decreased bases   Cardiovascular:  Regular rate and rhythm with normal S1/S2   Abdomen: Soft, non-distended with normal bowel sounds. Musculoskeletal:  No clubbing, cyanosis; cool feet to touch, +2 bilateral lower extremity edema. Limited range of motion, patient does not respond to commands to move lower extremities or upper extremities   Skin: Skin color, texture, turgor poor  Neurologic:  Eyes open, patient moans to touch, does not follow commands   Psychiatric:  Alert, see above   Peripheral Pulses: palpable, equal bilaterally     Xr Chest Portable  Result Date: 4/5/2019  some residual changes are seen in the lung parenchyma chronic basis. No superimposed acute cardiopulmonary process.     EKG:  Sinus tachycardia with PVCs, rate of 121    Labs:     Recent Labs     04/05/19 2039   WBC 14.4*   HGB 13.5   HCT 39.7        Recent Labs     04/05/19 2039      K 3.2*      CO2 25   BUN 10   CREATININE 0.7   CALCIUM 7.9*     Recent Labs 04/05/19 2039   AST 9   ALT 7   BILITOT 0.8   ALKPHOS 80     Recent Labs     04/05/19 2039   INR 1.6     Recent Labs     04/05/19 2039   TROPONINI 0.08*       Urinalysis:      Lab Results   Component Value Date    NITRU POSITIVE 04/05/2019    WBCUA PACKED 04/05/2019    BACTERIA MANY 04/05/2019    RBCUA 5-10 04/05/2019    BLOODU MODERATE 04/05/2019    SPECGRAV 1.020 04/05/2019    GLUCOSEU Negative 04/05/2019     ASSESSMENT:    Active Hospital Problems    Diagnosis Date Noted    Severe sepsis ([facility] [A41.9, R65.20] 04/06/2019     Sepsis  - In setting of UTI, elevated WBC of 14.4, normal lactic acid, tachycardia, tachypnea and low blood pressure  - Blood cultures and urine cultures sent in the emergency room    Acute respiratory failure  - Patient was placed on BiPAP in the emergency room for tachypnea and low blood pressure, increased work of breathing.  - Patient did improve slightly in respiratory status, patient is a DNR CC and BiPAP removed this morning.  - At this point, patient is on nasal cannula at 6 L on oxygen is 95% with stable respirations.     Metabolic Encephalopathy    UTI   - catheter associated     Failure to thrive     Advanced Senescence     Hypokalemia   - 3.2 on admit with poor oral intake for several days per nursing home     Hypoalbuminemia and likely severe malnutrition     Severe physical deconditioning     Diabetes, type 2, controlled on insulin   Lab Results   Component Value Date    LABA1C 7.1 (H) 06/04/2018   - now NPO due to encephalopathy and unable to take oral intake at this time     Neurogenic bladder with chronic jessica in place     Hx of Chronic DVTs s/p IVC and on Eliquis     DNR-CC     PLAN:    IVF   IV antibiotics   Comfort care   Palliative care   Patient appears unable to participate in any swallow study at this time and this was discussed with son this am, agree to keep comfortable and no aggressive testing   If patient is more alert and able to participate, attempt bedside swallow eval before oral intake otherwise NPO  Mouth care   Pending blood and urine culture   No need for tele   Oxygen per n/c/ mask ; do not reinstitute bipap unless son/family/patient agree     DVT Prophylaxis:SCDs   Diet: Diet NPO Effective Now  Code Status: DNR-CC    PT/OT Eval Status: na     Dispo - general floor/ palliative care consult; consider hospice      Felix Ruffin, APRN - CNP    Thank you Naren Lucero DO for the opportunity to be involved in this patient's care.  If you have any questions or concerns please feel free to contact me at [phone]

## 2019-04-06 NOTE — ED NOTES
Spoke with clinical manager  Stated pt should go to 8s ex or 4s ex with bipap     Dilcia Suarez RN  04/06/19 0071

## 2019-04-06 NOTE — PROGRESS NOTES
Palliative Medicine Social Work     Patient Name: Nithin Griggs  Age: [de-identified] y.o.  Status: Unknown    Decision-maker: Son/KHADRA Gutierrez [phone] / Daughter/CAROLINE Garza [phone]. Marquise Hollingsworth states that Que Shin has deferred most decision-making to him as most family members have had difficulty dealing with pt's verbal aggressiveness. Additional Support: Pt's granddaughter has assisted. Minor Children: Great-granddaughter is 5 months and pt loves to see her per family. Advanced Directives: Family to bring in paperwork. Confirm Code Status: DNR-CC, confirmed by son today. Current Goals of Care: comfort care    Mental Health History: None known. Substance Abuse: None known. Indications of Abuse/Neglect: No concerns noted. Financial Concerns: No concerns noted. Living Situation: Pt lives at [address] [facility] since January of this year. Prior to that, he had been in several [facility] stays following hospital stays. Physical Care Needs Met: Yes    Emotional Needs Met: Yes    Future Care Needs/Goals/Anticipatory Guidance: Support as needed    [facility]eds[facility]    Assessment: LSW met with pt at bedside along with PM NP. Pt had eyes open but unable to respond. LSW contacted pt's son Nader Fields by phone. He stated that he has made decision to focus on comfort care for pt. Pt has Alzheimer's dementia and has had 40 pound weight loss over the last two months with continued decline. Nader Fields stated that he has begun thinking about hospice, would like to discuss further when he arrives here in an hour. LSW and PM NP met with Nader Fields in room. Pt now awake and talking but very confused and at times agitated. Spoke with son Nader Fields in [place] to prevent further agitation. He would like to start hospice services with pt as he notes a decline over the past year, especially over the last few months due to Alzheimer's Dementia.  Pt was 217 pounds in June 2018 and is now 187. Pt has a diminished appetite, is not getting up and is becoming more agitated and confused and has become more aggressive, both physically and verbally. LSW offered Hospice choices, son chose [facility] due to receiving services for his mother there in past. Discussed settings Hospice can occur in, family is hopeful that pt may qualify for [facility]. LSW discussed role for short term symptom management and role of hospice also in ECF. Azael Esposito will meet with [facility] [place] for further information. Psychosocial support provided. Referral made to [facility]. Plan: Follow up for support as needed.

## 2019-04-07 NOTE — PROGRESS NOTES
Hospitalist Progress Note      PCP: Hina Ware DO    Date of Admission: 4/5/2019    Chief Complaint: SOB     Hospital Course: This is a  [de-identified] y.o. male who presented to [address] [place] [address] with decline in the last few days, poor oral intake and shortness of breath. Past medical history of CHF, chronic DVTs status post IVC filter, COPD, diabetes, hypertension, neurogenic bladder with Granados in place. Patient is a DNR CC from nursing home. ED course: Blood pressure 94/70, heart rate 128, temperature 99.3, 97% on 2 L of oxygen. Patient was to Neck with respiratory 26-29. He was placed on BiPAP. Pressure dropped to 67/46 with tachypnea. Lab work showed potassium of 3.2, glucose 234, lactic acid of 1.4, BNP of 5835 with troponin of 0.08, albumin of 2.1 and WBC of 14.4.  UA had foul smell with positive UTI on UA. Chest x-ray was no acute disease. Patient was medicated in the emergency room with 500 mL bolus of normal saline, Rocephin 1 g, Solu-Cortef 50 mg, DuoNeb aerosol treatments ×3. He was admitted for further evaluation and treatment. patient is minimally responsive and does moan to pain and touch. He does not respond to commands. BiPAP was taken off. Palliative care consulted and Hospice consulted. Subjective: Patient appears comfortable, lying in bed in no acute distress. He is awake and more alert than yesterday but is not aware of where he is. He  denies any pain at this time or shortness of breath.       Medications:  Reviewed    Infusion Medications    dextrose      0.9% NaCl with KCl 20 mEq 50 mL/hr at 04/06/19 1441     Scheduled Medications    sodium chloride flush  10 mL Intravenous 2 times per day    cefTRIAXone (ROCEPHIN) IV  1 g Intravenous Q24H    apixaban  5 mg Oral BID    aspirin  81 mg Oral Daily    divalproex  1,000 mg Oral TID    donepezil  10 mg Oral Nightly    DULoxetine  60 mg Oral Daily    ferrous sulfate  325 mg Oral BID WC    folic acid  1 mg Oral 04/07/19  0514    150*   K 3.2* 3.4*    111*   CO2 25 25   BUN 10 11   CREATININE 0.7 0.8   CALCIUM 7.9* 8.0*     Recent Labs     04/05/19 2039   AST 9   ALT 7   BILITOT 0.8   ALKPHOS 80     Recent Labs     04/05/19 2039   INR 1.6     Recent Labs     04/05/19 2039   TROPONINI 0.08*     Assessment/Plan:    Active Hospital Problems    Diagnosis Date Noted    Severe sepsis ([facility] [address] [A41.9, R65.20] 04/06/2019     Sepsis/bacteremia  - In setting of UTI, elevated WBC of 14.4, normal lactic acid, tachycardia, tachypnea and low blood pressure  - Blood cultures and urine cultures sent in the emergency room  - 1 cultures positive GNR are 2/4     Acute respiratory failure  - Patient was placed on BiPAP in the emergency room for tachypnea and low blood pressure, increased work of breathing.  - Patient did improve slightly in respiratory status, patient is a DNR CC and BiPAP removed this morning.  - At this point, patient is on nasal cannula at 6 L on oxygen is 95% with stable respirations.     Metabolic Encephalopathy     UTI   - Suprapubic catheter in place; catheter associated      Failure to thrive      Advanced Senescence      Hypernatremia    Hypokalemia   - 3.2 on admit with poor oral intake for several days per nursing home      Hypoalbuminemia and likely severe malnutrition      Severe physical deconditioning      Diabetes, type 2, controlled on insulin         Lab Results   Component Value Date     LABA1C 7.1 (H) 06/04/2018   - now NPO due to encephalopathy and unable to take oral intake at this time      Neurogenic bladder with chronic jessica in place      Hx of Chronic DVTs s/p IVC and on Eliquis      DNR-CC      PLAN:     IVF changed due to Hypernatremia   Hospice consult per Palliative   ID consult for + BC   IV antibiotics   Patient is more alert and if able to participate, attempt bedside swallow eval before oral intake otherwise NPO  Mouth care   No need for tele; can tranfer to general floor Oxygen per n/c/ mask ; do not reinstitute bipap unless son/family/patient agree      DVT Prophylaxis:SCDs   Diet: Diet NPO Effective Now  Code Status: DNR-CC    Dispo - consulted hospice of the [facility], APRN - CNP

## 2019-04-07 NOTE — PROGRESS NOTES
Liaison Informational Visit Note      Referral received from [facility]      Patient Name: Oskar Ware   :  1940  MRN:  [id number]    Admit date:  2019      Hospital Admitting Physician:  Earl Hernandez MD   PCP:  Sailaja Huston DO      Primary Insurance: Payor: Barby Good /  /  /    Secondary Insurance:  unknown    Emergency Contact:      Contact/Relation:   /         Phone:       Contact/Relation:   /     Phone:     Advance Directive  Advance directives received No  Patient has a documented healthcare surrogate  Discussed with: Family member  DPOA-HC Name-Relation:    Phone:       Terminal Diagnosis Dementia received by TALA Srivastava CNP      Current Hospital Problem List:   Patient Active Problem List   Diagnosis Code    Cirrhosis ([facility] [address] K74.60    Diabetes mellitus type 2, uncontrolled ([address] E11.65    Essential hypertension, benign I10    History of DVT (deep vein thrombosis) Z86.718    COPD (chronic obstructive pulmonary disease) ([address] J44.9    Moderate protein-calorie malnutrition ([address] E44.0    Alzheimer's dementia with behavioral disturbance G30.9, F02.81    Hematuria R31.9    Neurogenic bladder N31.9    Nephrolithiasis N20.0    Volume overload E87.70    Acute combined systolic and diastolic CHF, NYHA class 1 ([address] I50.41    Severe sepsis ([address] A41.9, R65.20       Code Status Order: DNR-CC     Past Medical History:        Diagnosis Date    Anemia     Arthritis     CHF (congestive heart failure) (HCC)     Chronic back pain     Chronic deep vein thrombosis (DVT) of femoral vein of right lower extremity ([address] 3/9/2018    COPD (chronic obstructive pulmonary disease) ([facility] [address]     Deep vein thrombosis ([address] 2001    Right popliteal/right femoral vein. Patient is on chronic Coumadin therapy. 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   Patient had been evaluated by Dr. Lucita Portillo and was advised knee-high compression facility. Explained Transition Program at the [facility]. Discussed roles and frequency visits of skilled nursing, personal care team, SW,  and non medical volunteers. Patient's son expressed that patient lives in a NH private pay. He would like to make sure his father is comfortable. Son is requesting Dr. Jamal Lucio be consulted during this admission. Patient has seen Dr. Jamal Lucio in the past and needed drained. Son feels that it will provide patient with comfort if it is needed. Son will speak with his sister this evening regarding hospice care and will let staff know their decision and a discharge plan. Son can not take patient home. Patient will need to return back to a facility. Emotional support and active listening provided. Brochure given. Updated charge nurse and bedside RN. HOT plan: 1. Will follow up tomorrow with son. 2. Hospice is not managing any aspect of the patient's care presently. 3. Please call [facility] with any questions at [phone].     Electronically signed by Harleen Mendes RN on 4/7/2019 at 2:23 PM

## 2019-04-07 NOTE — PLAN OF CARE
Problem: Risk for Impaired Skin Integrity  Goal: Tissue integrity - skin and mucous membranes  Description  Structural intactness and normal physiological function of skin and  mucous membranes.   4/7/2019 0625 by Jenise Tadeo RN  Outcome: Met This Shift  4/6/2019 2215 by Elma Benson RN  Outcome: Met This Shift     Problem: Falls - Risk of:  Goal: Will remain free from falls  Description  Will remain free from falls  4/7/2019 0625 by Jenise Tadeo RN  Outcome: Met This Shift  4/6/2019 2215 by Elma Benson RN  Outcome: Met This Shift  Goal: Absence of physical injury  Description  Absence of physical injury  4/7/2019 0625 by Jenise Tadeo RN  Outcome: Met This Shift  4/6/2019 2215 by Elma Benson RN  Outcome: Met This Shift

## 2019-04-07 NOTE — PROGRESS NOTES
Referral received. Chart reviewed. Visit made to unit. No family present. Patient is awake, confused. No distress noted. Call placed to Azael Esposito, patient's son. Will meet around noon to discuss hospice care.

## 2019-04-08 NOTE — CONSULTS
Inpatient consult to Urology  Consult performed by: TALA Chau - CNP  Consult ordered by: Myles Tanner DO        4/8/2019 2:32 PM  Service: Urology  Group: [facility] (Anshul/Sonia/Graeme)    Yuval Back  [phone]     Chief Complaint:    Suprapubic pain  Suprapubic tube    History of Present Illness: The patient is a [de-identified] y.o. male patient who presents with SOB and poor oral intake. He is noted to be dehydrated. He has a hx of neurogenic bladder, prostatic obstruction, urinary tract calculi and has an SP tube. Pt has AMS and resides at a long term care facility. His general health has been deteriorating. He has ascites. He is currently unable to provide me with any information. I am unsure when his SP tube was changed last. He does have some dried blood around his insertion site and his urine is cloudy. Past Medical History:   Diagnosis Date    Anemia     Arthritis     CH[facility]congestive heart failure) ([facility])     Chronic back pain     Chronic deep vein thrombosis (DVT) of femoral vein of right lower extremity ([facility]) 3/9/2018    COPD (chronic obstructive pulmonary disease) ([facility])     Deep vein thrombosis ([facility] [address] 4/2001    Right popliteal/right femoral vein. Patient is on chronic Coumadin therapy. 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 Patient had been evaluated by Dr. Yee Kaiser and was advised knee-high compression stockings.  Degenerative joint disease     Both knees.  Diabetes mellitus ([address]     GERD (gastroesophageal reflux disease)     H/O chronic prostatitis     S/P prostate biopsy in 12/2001.  Hypercoagulable state ([address]     Patient is homozygote for IBAN-1 and MTHFR with decreased anti-thrombiin-3 level.     Hypertension     Lymphedema of both lower extremities 5/28/2016    Mitral valve insufficiency     Neurogenic bladder     Post-thrombotic syndrome of right lower extremity 3/10/2018    Renal calculus     S/P IVC filter 5/28/2016    Thoracic aortic aneurysm (HCC)     Unspecified cirrhosis of liver ([facility] [address]     Venous insufficiency of both lower extremities 3/10/2018         Past Surgical History:   Procedure Laterality Date    BRONCHOSCOPY  4/8/15    CHOLECYSTECTOMY      COLONOSCOPY      CYSTOSCOPY  08/16/2016    retrogrades. stent changed.  DENTAL SURGERY      Full mouth extraction.  EYE SURGERY      JUAN C LENSE IMPLANTS    FOOT SURGERY Bilateral     Correction of bone abnormality.     OTHER SURGICAL HISTORY  10/15/15    right ESWL    MS ASPIRATION BLADDER INSERT SUPRAPUBIC CATHETER N/A 3/15/2018    CYSTOSCOPY SUPRAPUBIC TUBE PLACEMENT performed by Britt Aviles MD at [address] [place] [address]  10/07/2015    Lea Amos       Medications Prior to Admission:    Medications Prior to Admission: DULoxetine (CYMBALTA) 60 MG extended release capsule, Take 60 mg by mouth daily  Dextrose-Sodium Chloride (DEXTROSE 5% AND 0.45% NACL) 5-0.45% bolus, Infuse 70 mLs intravenously once Indications: 70mL q shift  Mineral Oil-Hydrophil Petrolat OINT, Apply topically Indications: at bedtime  mirtazapine (REMERON) 15 MG tablet, Take 15 mg by mouth nightly  Cholecalciferol (VITAMIN D3) 70394 units CAPS, Take by mouth once a week Indications: on tuesday  vitamin D (CHOLECALCIFEROL) 1000 UNIT TABS tablet, Take 2,000 Units by mouth daily  furosemide (LASIX) 20 MG tablet, Take 20 mg by mouth 2 times daily Indications: Increase in the Amount of Urine Produced  insulin glargine (LANTUS) 100 UNIT/ML injection vial, Inject 10 Units into the skin nightly  divalproex (DEPAKOTE ER) 250 MG extended release tablet, Take 1 tablet by mouth 2 times daily (Patient taking differently: Take 1,000 mg by mouth 3 times daily )  spironolactone (ALDACTONE) 50 MG tablet, Take 1 tablet by mouth daily  insulin lispro (HUMALOG) 100 UNIT/ML injection vial, Inject 0-12 Units into the skin 3 times daily (with meals)  potassium chloride (KLOR-CON M) 20 MEQ extended release tablet, Take 1 tablet by mouth every other day  pantoprazole (PROTONIX) 40 MG tablet, Take 40 mg by mouth daily  vitamin B-1 100 MG tablet, Take 1 tablet by mouth daily  magnesium oxide (MAG-OX) 400 (240 MG) MG tablet, Take 1 tablet by mouth daily  apixaban (ELIQUIS) 5 MG TABS tablet, Take 1 tablet by mouth 2 times daily  aspirin 81 MG EC tablet, Take 81 mg by mouth daily  ferrous sulfate 325 (65 FE) MG tablet, Take 1 tablet by mouth 2 times daily (with meals)  folic acid (FOLVITE) 1 MG tablet, Take 1 tablet by mouth daily. donepezil (ARICEPT) 5 MG tablet, Take 1 tablet by mouth nightly. (Patient taking differently: Take 10 mg by mouth nightly )  bisacodyl (DULCOLAX) 10 MG suppository, Place 10 mg rectally daily as needed for Constipation  docusate sodium (COLACE) 100 MG capsule, Take 100 mg by mouth 2 times daily as needed for Constipation  loperamide (IMODIUM) 2 MG capsule, Take 2 mg by mouth 4 times daily as needed for Diarrhea  magnesium hydroxide (MILK OF MAGNESIA) 400 MG/5ML suspension, Take 30 mLs by mouth daily as needed for Constipation  cholestyramine (QUESTRAN) 4 g packet, Take 1 packet by mouth 2 times daily as needed  senna (SENOKOT) 8.6 MG tablet, Take 1 tablet by mouth 2 times daily as needed for Constipation  glucagon, rDNA, 1 MG injection, Inject 1 mg into the muscle as needed for Low blood sugar (Blood glucose less than 70 mg/dL and patient NOT ALERT or NPO and does not have IV access.)  acetaminophen (TYLENOL) 325 MG tablet, Take 2 tablets by mouth every 4 hours as needed for Fever (Temp greater than 100.5, for pain score 1-3)    Allergies:    Sulfa antibiotics    Social History:    reports that he quit smoking about 39 years ago. His smoking use included cigarettes. He has a 60.00 pack-year smoking history. He has never used smokeless tobacco. He reports that he does not drink alcohol or use drugs.     Family History: Non-contributory to this Urological problem  family history is not on file. Review of Systems:  Unable to obtain due to mental status    Physical Exam:     Vitals:  BP (!) 107/57   Pulse 97   Temp 97.7 °F (36.5 °C) (Temporal)   Resp 18   Ht 5' 9\" (1.753 m)   Wt 187 lb (84.8 kg)   SpO2 96%   BMI 27.62 kg/m²     General:  Awake, alert, confused, chronically ill appearing. HEENT:  Normocephalic, atraumatic. Mucus membranes very dry  Neck:  Supple, no masses. Heart:  RRR  Lungs:  No audible wheezing. Respirations symmetric and non-labored. Abdomen: ascites noted, pt is not tender over abdomen or suprapubic area. SP tube site looks healthy. There was a very small amount of dried blood at insertion site. Extremities:  No clubbing, cyanosis, or edema  Skin:  Warm and dry  Neuro: Confused. Moves all extremities spontaneously   Rectal: deferred  Genitalia:  Uncircumcised. No obvious mass or lesions of penis or scrotum.    Labs:     Recent Labs     04/05/19 2039 04/07/19  0514 04/08/19  0751   WBC 14.4* 17.8* 17.8*   RBC 4.38 4.38 4.48   HGB 13.5 13.3 13.4   HCT 39.7 40.8 41.7   MCV 90.6 93.2 93.1   MCH 30.8 30.4 29.9   MCHC 34.0 32.6 32.1   RDW 16.0* 16.3* 16.9*    267 250   MPV 9.3 9.5 9.9         Recent Labs     04/05/19 2039 04/07/19  0514   CREATININE 0.7 0.8       /8/2019 11:56 AM - Christophe, Mhy Incoming Results From [facility]     Specimen Information: Blood        Component Collected Lab   Blood Culture, Routine Abnormal  04/05/2019  9:40 PM  - [facility] Lab   Gram stain performed from blood culture bottle media   Gram negative rods            4/5/2019 10:32 PM - Christophe, Mhy Incoming Results From [facility]     Component Value Ref Range & Units Status Collected Lab   Color, UA Yellow  Straw/Yellow Final 04/05/2019  9:49 PM  - [address], UA TURBIDAbnormal   Clear Final 04/05/2019  9:49 PM  - [address] [facility] Lab   Glucose, Ur Negative  Negative mg/dL Final 04/05/2019  9:49 PM  [facility] Lab   Bilirubin Urine Negative  Negative Final 04/05/2019  9:49 PM [facility] [address] Lab   Ketones, Urine Negative  Negative mg/dL Final 04/05/2019  9:49 PM [address] Lab   Specific [place], UA 1.020  1.005 - 1.030 Final 04/05/2019  9:49 PM [facility] [address] [facility]   Blood, Urine MODERATEAbnormal   Negative Final 04/05/2019  9:49 PM [address] [facility]   pH, UA 5.5  5.0 - 9.0 Final 04/05/2019  9:49 PM [address] Lab   Protein, UA 30Abnormal   Negative mg/dL Final 04/05/2019  9:49 PM  - [address] [place] [address], Urine 0.2  <2.0 E.U./dL Final 04/05/2019  9:49 PM [address] Lab   Nitrite, Urine POSITIVEAbnormal   Negative Final 04/05/2019  9:49 PM [address] Lab   Leukocyte Esterase, Urine LARGEAbnormal   Negative Final 04/05/2019  9:49 PM [address] Lab     4/5/2019 10:55 PM - Christophe, y Incoming Results From [facility]     Component Value Ref Range & Units Status Collected Lab   WBC, UA PACKED  0 - 5 /HPF Final 04/05/2019  9:49 PM [address] Lab   RBC, UA 5-10Abnormal   0 - 2 /HPF Final 04/05/2019  9:49 PM [facility] [address] Lab   Bacteria, UA MANYAbnormal   /HPF Final 04/05/2019  9:49 PM [facility] [address] Lab   4/7/2019  9:10 AM - Christophe, y Incoming Results From [facility]     Specimen Information: Urine, clean catch        Component Collected Lab   Urine Culture, Routine 04/06/2019  6:00 AM  - [facility] Lab   ,000 CFU/mL   Mixed hali isolated. Further workup and sensitivity testing   is not routinely indicated and will not be performed. Mixed hali isolated includes:   Mixed gram negative rods   Mixed gram positive organisms      Procedure note: After carefully removing previous catheter, I cleaned and draped site. 30 fr catheter was inserted into the bladder and the balloon was filled with 50 cc sterile water (there was previously 60 cc in the balloon).  I irrigated the catheter to verify placement and then connected to a catheter

## 2019-04-08 NOTE — PROGRESS NOTES
Hospitalist Progress Note      PCP: Jony Last, DO    Date of Admission: 4/5/2019    Chief Complaint:  SOB    Hospital Course:   He is from a nursing home,  Found to be septic due to bacteremia. On merro. His son has decided to stop  Anything by mouth. He states his dad will not eat, and he put him in hospice. (he said he thinks it is the end). He does want his dad to see urology bc suprapubic pain. Has a suprapubic catheter. Subjective:    confused, son at bedside    Medications:  Reviewed    Infusion Medications    dextrose       Scheduled Medications    potassium chloride  20 mEq Oral Once    meropenem  1 g Intravenous Q8H     PRN Meds: acetaminophen, LORazepam, sodium chloride flush, bisacodyl, glucose, dextrose, glucagon (rDNA), dextrose      Intake/Output Summary (Last 24 hours) at 4/8/2019 1337  Last data filed at 4/7/2019 2142  Gross per 24 hour   Intake --   Output 400 ml   Net -400 ml       Exam:    BP (!) 107/57   Pulse 97   Temp 97.7 °F (36.5 °C) (Temporal)   Resp 18   Ht 5' 9\" (1.753 m)   Wt 187 lb (84.8 kg)   SpO2 96%   BMI 27.62 kg/m²           Gen: well developed  HEENT: NC/AT, moist mucous membranes, no oropharyngeal erythema or exudate  Neck: supple, trachea midline, no anterior cervical or SC LAD  Heart:  Normal s1/s2, RRR, no murmurs, gallops, or rubs. Lungs: cta* bilaterally,   Abd: bowel sounds present, suprapubic catheter   Extrem:  No clubbing, cyanosis,   no* edema  Skin: no rashes or lesions  Psych: Not oriented to date, Not oriented to person or Not oriented to place  Neuro: grossly intact, moves all four extremities.     Capillary Refill: Brisk,< 3 seconds   Peripheral Pulses: +2 palpable, equal bilaterally               Labs:   Recent Labs     04/05/19 2039 04/07/19  0514 04/08/19  0751   WBC 14.4* 17.8* 17.8*   HGB 13.5 13.3 13.4   HCT 39.7 40.8 41.7    267 250     Recent Labs     04/05/19 2039 04/07/19  0514    150*   K 3.2* 3.4*    111*

## 2019-04-08 NOTE — PROGRESS NOTES
POWER MIDLINE CATH Placement 4/8/2019    Product number: [id number]   Lot Number: [id number]      Ultrasound: YES   Anatomy; [place] [id number] R Brachial      Upper Arm Circumference: 25 CM   Size: 4.5 FR SL    Exposed Length: 2 CM    Internal Length: 13 CM   Cut: 0 CM   Vein Measurement: 0.38 CM    Claribel Nick  4/8/2019  5:03 PM        MIDLINE PLACED PER ORDER.   UNABLE TO ESTABLISH PIV DUE TO POOR VENOUS ACCESS WITHOUT ULTRASOUND

## 2019-04-08 NOTE — PROGRESS NOTES
Palliative Care Department  Palliative Care Initial Consult  Provider: [address] [facility] Day: 4    Referring Provider:  TALA Valdez CNP    Reason for Consult:  [x]  Code status Discussion  [x]  Assist with goals of care  []  Psychosocial support  []  Symptom Management  []  Advanced Care Planning    Chief Complaint: Minal Wasserman is a [de-identified] y.o. male with chief complaint of sob    Assessment/Plan      Active Hospital Problems    Diagnosis Date Noted    Severe sepsis ([facility] [address] [A41.9, R65.20] 04/06/2019   alzheimer's,  Bacteremia/ UTI ID following  neurogenic bladder    Palliative Care Encounter/Recommendations:      - Goals of care: provide comfort care/support/palliation/relieve suffering     - Code Status: DNR-CC     - Further discussions with family regarding goals of care and code status to occur as needed, son still deciding on hospice       Subjective:     HPI:  Minal Wasserman is a [de-identified] y.o. male with significant past medical history of CHF, chronic DVTs status post IVC filter, COPD, alzheimer's, diabetes, hypertension, neurogenic bladder with supra pubic cath in place who presented with sob nursing home. He has also had poor appetite as well. Per attending note advanced care planning family wanted to focus on comfort care only. He will not be placed back on bipap. Subjective/Events/Discussions:  4/8/19  Patient has positive blood cultures likey due to UTI. He is for CT of abdomen and pelvis. Per [facility] son would like to discuss hospice with his sister. He would like to see what further testing reveal and get urology input. Patient is alert. No pain or sob but states he doesn't feel well. No family present. 4/619  Patient yells out, has confusion. He does not verbalizes but nods at times but unsure if he understands. Spoke with son. Patient has been declining over last few months. He has become aggressive and will yell and throw things.  He has not been eating and had significant weight loss of 40 lbs. He has not been eating. His son wants hospice care. Benefit explained. He chooses [facility].    - Family Meeting:   Participants:No family meeting held today   Family meeting was held to discuss:none       Allergies   Allergen Reactions    Sulfa Antibiotics Other (See Comments)     Swelling, SOB       ROS:   UNLESS STATED ABOVE PATIENT DENIES:  CONSTITUTIONAL:  fever, chill, rigors, nausea, vomiting, fatigue. RESPIRATORY: cough, shortness of breath, sputum expectoration. CARDIOVASCULAR:  Chest pain/pressure, palpitation, syncope, irregular beats  GASTROINTESTINAL:  abdominal or rectal pain, diarrhea, constipation, . GENITOURINARY:  Burning, frequency, urgency, incontinence,   INTEGUMENTARY: rash, wound, pruritis  MUSCULOSKELETAL:  pain, edema, joint swelling or redness      Objective:     Physical Exam  BP (!) 107/57   Pulse 97   Temp 97.7 °F (36.5 °C) (Temporal)   Resp 18   Ht 5' 9\" (1.753 m)   Wt 187 lb (84.8 kg)   SpO2 96%   BMI 27.62 kg/m²     Gen:  Alert, appears stated age, chronically ill appearing, in no acute distress  HEENT:  Normocephalic, conjunctiva pink, no drainage, mucosa moist  Neck:  Supple  Lungs:  decreased bilaterally, no audible rhonchi or wheezes noted  Heart[de-identified]  RRR, no murmur, rub, or gallop noted during exam  Abd:  Soft, non tender, non distended, BS+   supra pubic cath  Ext:  Moving all extremities, no edema, pulses present  Skin:  Warm and dry  Neuro:  Alert, Oriented to self.        Current Medications:  Inpatient/Home medications reviewed:    Results/Verification of Data Review  Objective data reviewed: labs, images, records, medication use, vitals and chart      - Advanced Directives: HC-POA   -Surrogate/Legal NOK: Child    Contacts:  Porfirio Pablokeley  [phone]    - Spiritual assessment: No spiritual distress identified     - Bereavement and grief: to be determined    - Discharge planning: to be determined    - Prognosis: unknown    - Referrals to: hospice    Time/Communication  Greater than 51% of time spent, total 25 minutes in counseling and coordination of care at the bedside regarding goals of care, symptom management, diagnosis and prognosis and see above. Alfonzo WOOD  Palliative Medicine    Discussed patient and the plan of care with the other IDT members of Palliative Med team and with hospice RN and floor nurse. Thank you for allowing Palliative Medicine to participate in the care of Citlali Denton. Note: This report was completed using computerDynadec voiced recognition software. Every effort has been made to ensure accuracy; however, inadvertent computerized transcription errors may be present.

## 2019-04-08 NOTE — PROGRESS NOTES
Met with Austin. Austin not with any decisions as of yet. Los Meléndez would like to speak with his sister today to see what she would like to do moving forward. Los Meléndez states that he would like for Dr Mary Grace to see patient to have him drained before discharge. Patient is scheduled for a CT scan of abdomen/pelvis today. Los Meléndez would like to see what this result is. We discussed if Los Meléndez would want to stop all treatment and have patient discharge with hospice. Los Meléndez wants to discuss with his sister. We discussed about patient returning to [facility] vs home. Los Meléndez states that his sister was supposed to move in with the patient to take care of him but when they discussed this yesterday does not seem that his sister wants to do this. Los Meléndez did agree that patient would need to return to [facility] until things were situated at home, if his sister would end up taking care of patient. Patient was already private pay at [address]. I will continue to follow.   AYDEN updated

## 2019-04-08 NOTE — PROGRESS NOTES
Follow up visit to patients room, no family present. Spoke with son Mercedes Geller and he is on way to the hospital now. I will meet with him when he arrives.

## 2019-04-08 NOTE — PROGRESS NOTES
Patient refused an IV site during the shift, Dr. Albert Sidhu notified of lack of IV site and unable to give antibiotics during the shift.  Will put patient on IV team.

## 2019-04-08 NOTE — PROGRESS NOTES
Patient still refusing an IV site and a blood sugar check at this time, will continue to monitor during the rest of the shift.

## 2019-04-08 NOTE — CONSULTS
Medications:    Prior to Admission medications    Medication Sig Start Date End Date Taking?  Authorizing Provider   DULoxetine (CYMBALTA) 60 MG extended release capsule Take 60 mg by mouth daily   Yes Historical Provider, MD   Dextrose-Sodium Chloride (DEXTROSE 5% AND 0.45% NACL) 5-0.45% bolus Infuse 70 mLs intravenously once Indications: 70mL q shift   Yes Historical Provider, MD   Mineral Oil-Hydrophil Petrolat OINT Apply topically Indications: at bedtime   Yes Historical Provider, MD   mirtazapine (REMERON) 15 MG tablet Take 15 mg by mouth nightly   Yes Historical Provider, MD   Cholecalciferol (VITAMIN D3) 59368 units CAPS Take by mouth once a week Indications: on tuesday   Yes Historical Provider, MD   vitamin D (CHOLECALCIFEROL) 1000 UNIT TABS tablet Take 2,000 Units by mouth daily   Yes Historical Provider, MD   furosemide (LASIX) 20 MG tablet Take 20 mg by mouth 2 times daily Indications: Increase in the Amount of Urine Produced   Yes Historical Provider, MD   insulin glargine (LANTUS) 100 UNIT/ML injection vial Inject 10 Units into the skin nightly   Yes Historical Provider, MD   divalproex (DEPAKOTE ER) 250 MG extended release tablet Take 1 tablet by mouth 2 times daily  Patient taking differently: Take 1,000 mg by mouth 3 times daily  3/16/18  Yes Charlee Castellanos, DO   spironolactone (ALDACTONE) 50 MG tablet Take 1 tablet by mouth daily 2/6/18  Yes Josefa Braun, DO   insulin lispro (HUMALOG) 100 UNIT/ML injection vial Inject 0-12 Units into the skin 3 times daily (with meals) 10/4/17  Yes Coolidge Cushing, DO   potassium chloride (KLOR-CON M) 20 MEQ extended release tablet Take 1 tablet by mouth every other day 10/4/17  Yes Coolidge Cushing, DO   pantoprazole (PROTONIX) 40 MG tablet Take 40 mg by mouth daily   Yes Historical Provider, MD   vitamin B-1 100 MG tablet Take 1 tablet by mouth daily 6/1/17  Yes Lamont Dorado MD   magnesium oxide (MAG-OX) 400 (240 MG) MG tablet Take 1 tablet by mouth daily 4/11/17  Yes Sheeba Alves MD   apixaban Edison Sear) 5 MG TABS tablet Take 1 tablet by mouth 2 times daily 12/7/16  Yes Farzana Syed MD   aspirin 81 MG EC tablet Take 81 mg by mouth daily   Yes Historical Provider, MD   ferrous sulfate 325 (65 FE) MG tablet Take 1 tablet by mouth 2 times daily (with meals) 7/5/16  Yes Olga Hart MD   folic acid (FOLVITE) 1 MG tablet Take 1 tablet by mouth daily. 1/13/15  Yes Farzana Syed MD   donepezil (ARICEPT) 5 MG tablet Take 1 tablet by mouth nightly. Patient taking differently: Take 10 mg by mouth nightly  1/13/15  Yes Farzana Syed MD   bisacodyl (DULCOLAX) 10 MG suppository Place 10 mg rectally daily as needed for Constipation    Historical Provider, MD   docusate sodium (COLACE) 100 MG capsule Take 100 mg by mouth 2 times daily as needed for Constipation    Historical Provider, MD   loperamide (IMODIUM) 2 MG capsule Take 2 mg by mouth 4 times daily as needed for Diarrhea    Historical Provider, MD   magnesium hydroxide (MILK OF MAGNESIA) 400 MG/5ML suspension Take 30 mLs by mouth daily as needed for Constipation    Historical Provider, MD   cholestyramine (QUESTRAN) 4 g packet Take 1 packet by mouth 2 times daily as needed    Historical Provider, MD   senna (SENOKOT) 8.6 MG tablet Take 1 tablet by mouth 2 times daily as needed for Constipation    Historical Provider, MD   glucagon, rDNA, 1 MG injection Inject 1 mg into the muscle as needed for Low blood sugar (Blood glucose less than 70 mg/dL and patient NOT ALERT or NPO and does not have IV access.) 6/7/18 6/2/19  Trenton Duke DO   acetaminophen (TYLENOL) 325 MG tablet Take 2 tablets by mouth every 4 hours as needed for Fever (Temp greater than 100.5, for pain score 1-3) 6/1/17   Farzana Syed MD       Allergies:  Sulfa antibiotics    Social History:   reports that he quit smoking about 39 years ago. His smoking use included cigarettes. He has a 60.00 pack-year smoking history.  He has never used smokeless tobacco. He reports that he does not drink alcohol or use drugs. Family History: family history is not on file. REVIEW OF SYSTEMS:     limited eval        PHYSICAL EXAM:    BP (!) 107/57   Pulse 97   Temp 97.7 °F (36.5 °C) (Temporal)   Resp 18   Ht 5' 9\" (1.753 m)   Wt 187 lb (84.8 kg)   SpO2 95%   BMI 27.62 kg/m²    VENT SETTINGS:   Vent Information  Skin Assessment: Clean, dry, & intact  FiO2 : 50 %    General appearance: Alert, Awake, Oriented times 3, no distress  Skin: Warm and dry  Eyes: Pink palpebral conjunctivae. PERRL  Ears: External ears normal.  Nose/Sinuses: Nares normal. Septum midline. Mucosa normal. No sinus tenderness. Oropharynx: Oropharynx clear with no exudates seen  Neck: Neck supple. Lungs: Lungs clear to auscultation bilaterally. No rhonchi, crackles or wheezes  Heart: S1 S2  Regular rate and rhythm. No rub, murmur or gallop  Abdomen: Abdomen soft, non-tender. BS normal. No masses, organomegaly  Extremities: No edema, Peripheral pulses palpable  Musculoskeletal: Muscular strength appears intact. No joint effusion, tenderness, swelling or warmth    Granados in place  DATA:    Labs:     Last 3 CBC:  Recent Labs     04/05/19 2039 04/07/19  0514   WBC 14.4* 17.8*   RBC 4.38 4.38   HGB 13.5 13.3    267   MPV 9.3 9.5       Last 3 BMP  Recent Labs     04/05/19 2039 04/07/19  0514    150*   K 3.2* 3.4*    111*   CO2 25 25   BUN 10 11   CREATININE 0.7 0.8   GLUCOSE 234* 97   CALCIUM 7.9* 8.0*       LIVER PROFILE:  Recent Labs     04/05/19 2039   AST 9   ALT 7   LABALBU 2.1*       URINARY CATHETER OUTPUT (Granados):  Suprapubic Catheter-Output (mL): 200 mL    DRAIN/TUBE OUTPUT:     Microbiology :  Recent Labs     04/05/19 2140   BC Gram stain performed from blood culture bottle media  Gram negative rods  *     Recent Labs     04/05/19 2149   BLOODCULT2 24 Hours- no growth     Recent Labs     04/06/19  0600   LABURIN ,000 CFU/mL  Mixed hali isolated.  Further workup and sensitivity testing  is not routinely indicated and will not be performed. Mixed hali isolated includes:  Mixed gram negative rods  Mixed gram positive organisms       No results for input(s): CULTRESP in the last 72 hours. No results for input(s): WNDABS in the last 72 hours. Radiology :  XR CHEST PORTABLE   Final Result   Some residual changes are seen in the lung parenchyma chronic basis. No superimposed acute cardiopulmonary process. Assessment and Plan:      CAUTI/Neuogenic bladder  GN bactremia from above    Plan  - keep on Meropenem  - follow urine/blood cultures  - if jessica has not been replaced already change it ?  Last time changed  - CT abdo/pelvis with contrast with bactremia  - hospice has been consulted as well                   Mojgan Ramirez MD

## 2019-04-08 NOTE — CARE COORDINATION
Social work made aware plan likely [facility]. Cindi Walker made aware.     Electronically signed by YANNA Camarena on 4/8/2019 at 12:19 PM

## 2019-04-08 NOTE — PROGRESS NOTES
ID Progress Note                [address] [facility], [address] [facility]            Phone [phone]     Fax [phone]      Chief complaint   SOB/AMS    Subjective:    Afebrile  Not alert , awkae making unpurposeful moves            Objective:    Vitals:    04/07/19 1815   BP: (!) 107/57   Pulse: 97   Resp: 18   Temp: 97.7 °F (36.5 °C)   SpO2: 95%     General appearance: Alert, Awake, Oriented times 3, no distress  Skin: Warm and dry  Eyes: Pink palpebral conjunctivae. PERRL  Ears: External ears normal.  Nose/Sinuses: Nares normal. Septum midline. Mucosa normal. No sinus tenderness. Oropharynx: Oropharynx clear with no exudates seen  Neck: Neck supple. Lungs: Lungs clear to auscultation bilaterally. No rhonchi, crackles or wheezes  Heart: S1 S2  Regular rate and rhythm. No rub, murmur or gallop  Abdomen: Abdomen soft, non-tender. BS normal. No masses, organomegaly  Extremities: No edema, Peripheral pulses palpable  Musculoskeletal: Muscular strength appears intact.  No joint effusion, tenderness, swelling or warmth        Labs:  Recent Labs     04/05/19  2039 04/07/19  0514 04/08/19  0751   WBC 14.4* 17.8* 17.8*   RBC 4.38 4.38 4.48   HGB 13.5 13.3 13.4   HCT 39.7 40.8 41.7   MCV 90.6 93.2 93.1   MCH 30.8 30.4 29.9   MCHC 34.0 32.6 32.1   RDW 16.0* 16.3* 16.9*    267 250   MPV 9.3 9.5 9.9     CMP:    Lab Results   Component Value Date     04/07/2019    K 3.4 04/07/2019     04/07/2019    CO2 25 04/07/2019    BUN 11 04/07/2019    CREATININE 0.8 04/07/2019    GFRAA >60 04/07/2019    LABGLOM >60 04/07/2019    GLUCOSE 97 04/07/2019    GLUCOSE 319 12/30/2011    PROT 6.1 04/05/2019    LABALBU 2.1 04/05/2019    CALCIUM 8.0 04/07/2019    BILITOT 0.8 04/05/2019    ALKPHOS 80 04/05/2019    AST 9 04/05/2019    ALT 7 04/05/2019          Microbiology :  Recent Labs     04/05/19  2140   BC Gram stain performed from blood culture bottle media  Gram negative rods  *     Recent Labs 04/05/19  2149   BLOODCULT2 24 Hours- no growth     Recent Labs     04/06/19  0600   LABURIN ,000 CFU/mL  Mixed hali isolated. Further workup and sensitivity testing  is not routinely indicated and will not be performed. Mixed hali isolated includes:  Mixed gram negative rods  Mixed gram positive organisms       No results for input(s): CULTRESP in the last 72 hours. No results for input(s): WNDABS in the last 72 hours. Radiology :  XR CHEST PORTABLE   Final Result   Some residual changes are seen in the lung parenchyma chronic basis. No superimposed acute cardiopulmonary process. CT ABDOMEN PELVIS WO CONTRAST    (Results Pending)         URINARY CATHETER OUTPUT (Jessica):  Suprapubic Catheter-Output (mL): 200 mL      Assessment and Plan:      CAUTI/Neuogenic bladder  GN bactremia from above     Plan  - keep on Meropenem  - follow urine/blood cultures  - if jessica has not been replaced already change it ?  Last time changed  - CT abdo/pelvis with contrast with bactremia  - hospice has been consulted as well  - hospice discussion ongoing            Electronically signed by Archana Hassan MD on 4/8/2019 at 9:50 AM

## 2019-04-09 NOTE — PROGRESS NOTES
Spoke with Jack Wheeler and the  at [facility]. They will only allow patient to return if patients bill is paid in full prior to patient returning. I spoke with Mason White to update him on what Denise said and he states he will need to speak with his sister regarding this because she is the one taking care of all of the patients finances. Mason White states that his sister is at work currently and he will need to speak with her later. I discussed with Mason White if there was any way for them to care for patient at home. Mason White states home is not an option. SW updated. Austin also states that Dr. Bill Barajas is to speak with him either later today or tomorrow.

## 2019-04-09 NOTE — PROGRESS NOTES
Spoke with Wilber Sears again and he asked if I could speak with his sister regarding options for discharge. Call to [facility]. Discussed with her all options for discharge- home with hospice, ECF with hospice and the [facility]. Kameron also states that home is not an option as they cannot provide 24 hour care and currently patients home is being fixed up. Kameron states that patient has a long term care policy that would pay for ECF. Kameron states that she will be contacting them now to be able to pay [address] but she is unsure how long it takes to receive the money. I explained that she would need to contact [address] regarding financial arrangements as I do not handle any of that. I will continue to follow. I did review patient with Ellis Rodgers [facility] Director. At this time patient is not appropriate for in patient (GIP) level of care. Also updated [facility] Director of Jihan .

## 2019-04-09 NOTE — PROGRESS NOTES
Hospitalist Progress Note      PCP: Belinda Florence DO    Date of Admission: 4/5/2019    Chief Complaint: SOB    Hospital Course: *He is from a nursing home,  Found to be septic due to bacteremia. On merro. His son has decided to stop  Anything by mouth. He states his dad will not eat, and he put him in hospice. (he said he thinks it is the end). He does want his dad to see urology bc suprapubic pain. Has a suprapubic catheter. Put on B&O suppositories for suprapubic pain, to go to [place] [address] unchanged, shows ascites and liver cirrhosis        **     Subjective: * no responding      Medications:  Reviewed    Infusion Medications    dextrose       Scheduled Medications    potassium chloride  20 mEq Oral Once    meropenem  1 g Intravenous Q8H     PRN Meds: acetaminophen, opium-belladonna, LORazepam, sodium chloride flush, bisacodyl, glucose, dextrose, glucagon (rDNA), dextrose      Intake/Output Summary (Last 24 hours) at 4/9/2019 1453  Last data filed at 4/9/2019 1413  Gross per 24 hour   Intake 200 ml   Output 150 ml   Net 50 ml       Exam:    BP 91/63   Pulse 97   Temp 96.8 °F (36 °C) (Temporal)   Resp 16   Ht 5' 9\" (1.753 m)   Wt 183 lb 9.6 oz (83.3 kg)   SpO2 99%   BMI 27.11 kg/m²         Gen: well developed  HEENT: NC/AT, Mucous membranes are very dry   Neck: supple, trachea midline, no anterior cervical or SC LAD  Heart:  Normal s1/s2, RRR, no murmurs, gallops, or rubs. Lungs: cta* bilaterally,   Abd: bowel sounds present, suprapubic catheter   Extrem:  No clubbing, cyanosis,   no* edema  Skin: no rashes or lesions  Psych: Not oriented to date, Not oriented to person or Not oriented to place  Neuro: grossly intact, moves all four extremities.     Capillary Refill: Brisk,< 3 seconds   Peripheral Pulses: +2 palpable, equal bilaterally                       Labs:   Recent Labs     04/07/19  0514 04/08/19  0751 04/09/19  0549   WBC 17.8* 17.8* 15.5*   HGB 13.3 13.4 12.6   HCT 40.8 41.7 39.9  250 216     Recent Labs     04/07/19  0514 04/09/19  0549   * 151*   K 3.4* 3.4*   * 113*   CO2 25 20*   BUN 11 12   CREATININE 0.8 0.8   CALCIUM 8.0* 7.9*     No results for input(s): AST, ALT, BILIDIR, BILITOT, ALKPHOS in the last 72 hours. No results for input(s): INR in the last 72 hours. No results for input(s): Nanetta Clem in the last 72 hours. No results for input(s): AST, ALT, ALB, BILIDIR, BILITOT, ALKPHOS in the last 72 hours. No results for input(s): LACTA in the last 72 hours.   No results found for: Mago Cazares Results   Component Value Date    AMMONIA 23.7 06/18/2018    AMMONIA 18.0 03/08/2018    AMMONIA 20.0 05/25/2017       Assessment:    Active Hospital Problems    Diagnosis Date Noted    [facility] [A41.9, R65.20] 04/06/2019   probably due to GN bacteremia  Neurogenic bladder  FTT  Metabolic encephalopathy   UTI  Hypoxia  IIDM              Plan:   to SNF with hospice  Electronically signed by Guillermo Tse DO on 4/9/2019 at 2:53 PM

## 2019-04-09 NOTE — PROGRESS NOTES
Met with son Jered Woods and he is in agreement with patient returning to [address] [place] [address] with HOTV. However he wants to speak with Dr. Shannan Lambert regarding \"draining\" patient before discharge. I did let Jered Woods know that patients SP catheter was changed yesterday. Jered Woods feels that if patient was drained he would be more comfortable. I did update RN that Jered Woods wants to speak with Dr. Shannan Lambert before proceeding forward with transition to hospice care with return to [address] [place] [address]. I will continue to follow. Placed call to [address] [place] [address] to see if they would need any equipment delivered- got voicemail for admissions and for the liaison. Left message. Will call again if no call back from them.

## 2019-04-09 NOTE — PROGRESS NOTES
Negative Final 04/05/2019  9:49 PM [facility] [address] [facility]   pH, UA 5.5  5.0 - 9.0 Final 04/05/2019  9:49 PM [address] Lab   Protein, UA 30Abnormal   Negative mg/dL Final 04/05/2019  9:49 PM  - [address], Urine 0.2  <2.0 E.U./dL Final 04/05/2019  9:49 PM [address] Lab   Nitrite, Urine POSITIVEAbnormal   Negative Final 04/05/2019  9:49 PM [address] Lab   Leukocyte Esterase, Urine LARGEAbnormal   Negative Final 04/05/2019  9:49 PM [address] Lab      4/5/2019 10:55 PM - Christophe, Mhy Incoming Results From [facility]      Component Value Ref Range & Units Status Collected Lab   WBC, UA PACKED  0 - 5 /HPF Final 04/05/2019  9:49 PM [address] Lab   RBC, UA 5-10Abnormal   0 - 2 /HPF Final 04/05/2019  9:49 PM [address] Lab   Bacteria, UA MANYAbnormal   /HPF Final 04/05/2019  9:49 PM [address] Lab   4/7/2019  9:10 AM - Christophe, Mhy Incoming Results From [facility]            Specimen Information: Urine, clean catch         Component Collected Lab   Urine Culture, Routine 04/06/2019  6:00 AM [address] Lab   ,000 CFU/mL   Mixed hali isolated. Further workup and sensitivity testing   is not routinely indicated and will not be performed. Mixed hali isolated includes:   Mixed gram negative rods   Mixed gram positive organisms              Assessment/Plan:  Neurogenic bladder,  SP tube  UTI, sepsis  Leukocytosis  Hypernatremia  Apparently this family does not want any fluids  Changed SP tube today  CT abdomen and pelvis has already been ordered  ID following. Family is considering Hospice for [facility]  Pt may have been having bladder spasms. TALA Chau CNP   HORTEN[facility]  Urology        The patient was seen and examined. I have reviewed the medical record in detail. I agree with the plan as outlined by NINA Santizo.     Angelic Barnhart MD  12:36 PM  4/9/2019

## 2019-04-10 NOTE — PLAN OF CARE
Problem: Risk for Impaired Skin Integrity  Goal: Tissue integrity - skin and mucous membranes  Description  Structural intactness and normal physiological function of skin and  mucous membranes.   4/9/2019 2112 by Dakota Almendarez RN  Outcome: Met This Shift  4/9/2019 1014 by Megan Delgado RN  Outcome: Met This Shift     Problem: Falls - Risk of:  Goal: Will remain free from falls  Description  Will remain free from falls  4/9/2019 2112 by Dakota Almendarez RN  Outcome: Met This Shift  4/9/2019 1014 by Megan Delgado RN  Outcome: Met This Shift  Goal: Absence of physical injury  Description  Absence of physical injury  4/9/2019 2112 by Dakota Almendarez RN  Outcome: Met This Shift  4/9/2019 1014 by Megan Delgado RN  Outcome: Met This Shift

## 2019-04-10 NOTE — PROGRESS NOTES
Call placed to admissions at [facility] and the liaison to inquire if any equipment will need to be delivered for patient- received voicemail for both, left messages.

## 2019-04-10 NOTE — CARE COORDINATION
Social work met with son in room. Made aware [facility] does not have bed available. He discussed with his sister. Would like [facility], liaisons made aware. Awaiting if able to accept.   Electronically signed by YANNA Chu on 4/10/2019 at 1:07 PM

## 2019-04-10 NOTE — PROGRESS NOTES
[facility]  PROGRESS NOTE    Chief Complaint:  Neurogenic bladder/BPH (S/P TURP)/Microhematuria/UTI  HPI:  He is comfortable. He denies any pain. No issues with the suprapubic tube which was changed yesterday    Vitals:    04/10/19 0600   BP: 121/81   Pulse: 97   Resp: 18   Temp:    SpO2:        Allergies: Sulfa antibiotics    PAST MEDICAL HISTORY:   Past Medical History:   Diagnosis Date    Anemia     Arthritis     CHF (congestive heart failure) (HCC)     Chronic back pain     Chronic deep vein thrombosis (DVT) of femoral vein of right lower extremity ([address] 3/9/2018    COPD (chronic obstructive pulmonary disease) ([facility])     Deep vein thrombosis ([facility] 4/2001    Right popliteal/right femoral vein. Patient is on chronic Coumadin therapy. 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 Patient had been evaluated by Dr. Jessica Mena and was advised knee-high compression stockings.  Degenerative joint disease     Both knees.  Diabetes mellitus ([address]     GERD (gastroesophageal reflux disease)     H/O chronic prostatitis     S/P prostate biopsy in 12/2001.  Hypercoagulable state ([address]     Patient is homozygote for IBAN-1 and MTHFR with decreased anti-thrombiin-3 level.  Hypertension     Lymphedema of both lower extremities 5/28/2016    Mitral valve insufficiency     Neurogenic bladder     Post-thrombotic syndrome of right lower extremity 3/10/2018    Renal calculus     S/P IVC filter 5/28/2016    Thoracic aortic aneurysm (HCC)     Unspecified cirrhosis of liver ([facility]     Venous insufficiency of both lower extremities 3/10/2018       PAST SURGICAL HISTORY:   Past Surgical History:   Procedure Laterality Date    BRONCHOSCOPY  4/8/15    CHOLECYSTECTOMY      COLONOSCOPY      CYSTOSCOPY  08/16/2016    retrogrades. stent changed.  DENTAL SURGERY      Full mouth extraction.     EYE SURGERY      JUAN C LENSE IMPLANTS    FOOT SURGERY Bilateral Correction of bone abnormality.  OTHER SURGICAL HISTORY  10/15/15    right ESWL    IN ASPIRATION BLADDER INSERT SUPRAPUBIC CATHETER N/A 3/15/2018    CYSTOSCOPY SUPRAPUBIC TUBE PLACEMENT performed by Minh Goff MD at [address] [place] [address]  10/07/2015    Lea Amos        PAST FAMILY HISTORY:  No family history on file. PAST SOCIAL HISTORY:    Social History     Socioeconomic History    Marital status:       Spouse name: Not on file    Number of children: 2    Years of education: 15    Highest education level: Not on file   Occupational History    Occupation: mill   Social Needs    Financial resource strain: Not on file    Food insecurity:     Worry: Not on file     Inability: Not on file   newMentor needs:     Medical: Not on file     Non-medical: Not on file   Tobacco Use    Smoking status: Former Smoker     Packs/day: 3.00     Years: 20.00     Pack years: 60.00     Types: Cigarettes     Last attempt to quit: 1980     Years since quittin.2    Smokeless tobacco: Never Used   Substance and Sexual Activity    Alcohol use: No     Alcohol/week: 0.0 oz    Drug use: No    Sexual activity: Never   Lifestyle    Physical activity:     Days per week: Not on file     Minutes per session: Not on file    Stress: Not on file   Relationships    Social connections:     Talks on phone: Not on file     Gets together: Not on file     Attends Sikhism service: Not on file     Active member of club or organization: Not on file     Attends meetings of clubs or organizations: Not on file     Relationship status: Not on file    Intimate partner violence:     Fear of current or ex partner: Not on file     Emotionally abused: Not on file     Physically abused: Not on file     Forced sexual activity: Not on file   Other Topics Concern    Not on file   Social History Narrative    Not on file       IV:    sodium chloride      dextrose PRN: acetaminophen, opium-belladonna, LORazepam, sodium chloride flush, bisacodyl, glucose, dextrose, glucagon (rDNA), dextrose    Scheduled:    cefTRIAXone (ROCEPHIN) IV  2 g Intravenous Q24H    potassium chloride  20 mEq Oral Once       Lab Results   Component Value Date     04/09/2019    K 3.4 04/09/2019    BUN 12 04/09/2019    CREATININE 0.8 04/09/2019        Lab Results   Component Value Date    HGB 12.6 04/09/2019    HCT 39.9 04/09/2019       Lab Results   Component Value Date    PSA 0.28 10/15/2015    PSA 0.24 04/10/2015    PSA 0.20 10/24/2014       Constitutional: tired  Eyes: negative  Ears, nose, mouth, throat, and face: negative  Respiratory: copd  Cardiovascular: chf  Gastrointestinal: negative  Genitourinary: bph/uti's  Musculoskeletal: arthritis  Neurological: negative  Behavioral/Psych: negative  Endocrine: diabetes     Skin dry, without rashes  Respirations non-labored, intact  Abdomen soft, non-tender, non-distended. Active bowel sounds. Obese. Alert and oriented x3. Slurred speech. Hard of hearing  SP tube draining clear, yellow urine.          Assessment and Plan:  Neurogenic bladder/BPH (S/P TURP)/Microhematuria/UTI  -Urine culture is growing mixed hali.  Finish antibiotics per infectious disease recommendations  -CT scan of his abdomen yesterday showed no acute urologic issues.  -Continue suprapubic tube catheter changes every 3-4 weeks and as needed  -Stable for discharge from urology standpoint          Shaina Scruggs MD  4/10/2019  6:40 AM

## 2019-04-10 NOTE — PROGRESS NOTES
Dr Hutchison Carrier updated again with bp 121/81 and hr -97. Has been sleeping and there for not pulling off o2 right now. Has had only about 63 cc of iv fluid.

## 2019-04-10 NOTE — PROGRESS NOTES
Served Dr Ernesto Lunsford again to inform chest xray was done, but no one there on night turn to read. Will recheck bp soon and update.  Tele sitter has been calling us with alarm when pt removes o2

## 2019-04-10 NOTE — CARE COORDINATION
AYDEN spoke with liaison Cha Galvan from [place] [facility], unable to accept. AYDEN spoke with liaison ST NICHOLE from [place], able to accept. $240 a day requiring a week payment in advance. Son Joselo shepherd made aware, communicated with sister, and both agreeable. Son went to make payment at facility today after speaking with [facility] over the phone. AYDEN made Cris with [employer] aware. Branden Brixey with [address] aware. Social work set up transport for 6:00-6:30 pm by [employer], spoke with [employer] and faxed over docs. Family, liaison, RN, and charge RN made aware. N-17 started. Ambulance form, facesheet, and envelope in soft chart.   Electronically signed by YANNA Hemphill on 4/10/2019 at 3:03 PM

## 2019-04-10 NOTE — PROGRESS NOTES
Follow up visit to patients room, no family present. Spoke with daughter Kameron over the phone as she was to be checking with [address] for financial arrangements. Kameron states that she did not speak with [facility] but did speak with [facility] regarding patient coming there. Kameron would like for a referral to [facility] now for patient to go there. I explained to Kameron that patient has been discharged from the hospital and will need moved as soon as possible. SW updated on family request for [facility].

## 2019-04-10 NOTE — PROGRESS NOTES
ID Progress Note                [address] [facility] [address], [place], [address]            Phone [phone]     Fax [phone]      Chief complaint   SOB/AMS    Subjective:    Afebrile  Not alert , awkae making unpurposeful moves  agitated            Objective:    Vitals:    04/09/19 1615   BP: (!) 125/59   Pulse: 95   Resp: 18   Temp: 97.5 °F (36.4 °C)   SpO2: 97%     General appearance: Alert, Awake, Oriented times 3, no distress  Skin: Warm and dry  Eyes: Pink palpebral conjunctivae. PERRL  Ears: External ears normal.  Nose/Sinuses: Nares normal. Septum midline. Mucosa normal. No sinus tenderness. Oropharynx: Oropharynx clear with no exudates seen  Neck: Neck supple. Lungs: Lungs clear to auscultation bilaterally. No rhonchi, crackles or wheezes  Heart: S1 S2  Regular rate and rhythm. No rub, murmur or gallop  Abdomen: Abdomen soft, non-tender. BS normal. No masses, organomegaly  Extremities: No edema, Peripheral pulses palpable  Musculoskeletal: Muscular strength appears intact. No joint effusion, tenderness, swelling or warmth        Labs:  Recent Labs     04/07/19  0514 04/08/19  0751 04/09/19  0549   WBC 17.8* 17.8* 15.5*   RBC 4.38 4.48 4.12   HGB 13.3 13.4 12.6   HCT 40.8 41.7 39.9   MCV 93.2 93.1 96.8   MCH 30.4 29.9 30.6   MCHC 32.6 32.1 31.6*   RDW 16.3* 16.9* 17.4*    250 216   MPV 9.5 9.9 10.3     CMP:    Lab Results   Component Value Date     04/09/2019    K 3.4 04/09/2019     04/09/2019    CO2 20 04/09/2019    BUN 12 04/09/2019    CREATININE 0.8 04/09/2019    GFRAA >60 04/09/2019    LABGLOM >60 04/09/2019    GLUCOSE 174 04/09/2019    GLUCOSE 319 12/30/2011    PROT 6.1 04/05/2019    LABALBU 2.1 04/05/2019    CALCIUM 7.9 04/09/2019    BILITOT 0.8 04/05/2019    ALKPHOS 80 04/05/2019    AST 9 04/05/2019    ALT 7 04/05/2019          Microbiology :  No results for input(s): BC in the last 72 hours. No results for input(s): Ely Rung in the last 72 hours.   No results for input(s): LABURIN in the last 72 hours. No results for input(s): CULTRESP in the last 72 hours. No results for input(s): WNDABS in the last 72 hours. Radiology :  CT ABDOMEN PELVIS WO CONTRAST   Final Result      Cirrhosis with ascites unchanged      New right lower lobe pulmonary parenchymal infiltrate with bilateral   pleural effusions         XR CHEST PORTABLE   Final Result   Some residual changes are seen in the lung parenchyma chronic basis. No superimposed acute cardiopulmonary process. URINARY CATHETER OUTPUT (Jessica):  Suprapubic Catheter-Output (mL): 450 mL      Assessment and Plan:      CAUTI/Neuogenic bladder  Ecoli bactremia from above     Plan  - D/C meropenem, change to ceftriaxone , would recommend PO option of PO omnicef 300mg BID fpr 10 days  - follow urine/blood cultures  - if jessica has not been replaced already change it ?  Last time changed  - CT abdo/pelvis with contrast with bactremia- noted  - hospice has been consulted as well  - hospice discussion ongoing            Electronically signed by Neto Dumont MD on 4/9/2019 at 10:55 PM

## 2019-04-10 NOTE — DISCHARGE INSTR - COC
Continuity of Care Form    Patient Name: Talat Ling   :  1940  MRN:  [id number]    Admit date:  2019  Discharge date:  ***    Code Status Order: [facility]   Advance Directives:   [facility] Documentation     Date/Time Healthcare Directive Type of Healthcare Directive Copy in [address] Agent's Name Healthcare Agent's Phone Number    [phone]  Yes, patient has an advance directive for healthcare treatment  --  Yes, copy in chart  --  --  --          Admitting Physician:  Sobeida Enriquez MD  PCP: Julissa Chatterjee DO    Discharging Nurse: [facility] Unit/Room#: 8189/6988-Y  Discharging Unit Phone Number: ***    Emergency Contact:   Extended Emergency Contact Information  Primary Emergency Contact: Lamonte Colón)  Address: [facility] Phone: [phone]  Work Phone: [phone]  Relation: Child  Secondary Emergency Contact: Jelena Higgins ([facility])  Address: [place], [address] [place] [address] Phone: [phone]  Relation: Brother/Sister    Past Surgical History:  Past Surgical History:   Procedure Laterality Date    BRONCHOSCOPY  4/8/15    CHOLECYSTECTOMY      COLONOSCOPY      CYSTOSCOPY  2016    retrogrades. stent changed.  DENTAL SURGERY      Full mouth extraction.  EYE SURGERY      JUAN C LENSE IMPLANTS    FOOT SURGERY Bilateral     Correction of bone abnormality.     OTHER SURGICAL HISTORY  10/15/15    right ESWL    HI ASPIRATION BLADDER INSERT SUPRAPUBIC CATHETER N/A 3/15/2018    CYSTOSCOPY SUPRAPUBIC TUBE PLACEMENT performed by Kamar Villagomez MD at [address] [place] [address]  10/07/2015    Lea Olivia       Immunization History:   Immunization History   Administered Date(s) Administered    Influenza Virus Vaccine 10/08/2015    Influenza, MDCK, Preservative free 2014    PPD Test 2016    Pneumococcal Polysaccharide ([id number]) 2014       Active Problems:  Patient Active Problem List   Diagnosis Code    Cirrhosis ([facility] K74.60    Diabetes mellitus type 2, uncontrolled ([facility] E11.65    Essential hypertension, benign I10    History of DVT (deep vein thrombosis) Z86.718    COPD (chronic obstructive pulmonary disease) ([facility]) J44.9    Moderate protein-calorie malnutrition ([facility]) E44.0    Alzheimer's dementia with behavioral disturbance G30.9, F02.81    Hematuria R31.9    Neurogenic bladder N31.9    Nephrolithiasis N20.0    Volume overload E87.70    Acute combined systolic and diastolic CHF, NYHA class 1 ([facility]) I50.41    Severe sepsis ([facility]) A41.9, R65.20       Isolation/Infection:   Isolation          No Isolation        Patient Infection Status     Infection Encounter Level?  Onset Date Added Added By Resolved Resolved By Review Date    VRE No  08/03/15 Zo Ham RN 04/19/16 Vika Gaston RN     vre in urine 7/31/15          Nurse Assessment:  Last Vital Signs: /62   Pulse 99   Temp 97.7 °F (36.5 °C) (Temporal)   Resp 18   Ht 5' 9\" (1.753 m)   Wt 185 lb 14.4 oz (84.3 kg)   SpO2 96%   BMI 27.45 kg/m²     Last documented pain score (0-10 scale): Pain Level: 0  Last Weight:   Wt Readings from Last 1 Encounters:   04/10/19 185 lb 14.4 oz (84.3 kg)     Mental Status:  disoriented and alert    IV Access:  {[facility] IV ACCESS:[id number]}    Nursing Mobility/ADLs:  Walking   Dependent  Transfer  Dependent  Bathing  Dependent  Dressing  Dependent  Toileting  Dependent  Feeding  Dependent  Med Admin  Dependent  Med Delivery   none    Wound Care Documentation and Therapy:  Wound 06/04/18 Abrasion(s) Scrotum (Active)   Number of days: 310       Wound 06/04/18 Abrasion(s) Hip Right (Active)   Number of days: 310        Elimination:  Continence:   · Bowel: No  · Bladder: No  Urinary Catheter: None    Suprapubic catheter  Colostomy/Ileostomy/Ileal Conduit: No       Date of Last BM: N/A    Intake/Output Summary (Last 24 hours) at 4/10/2019 8649  Last data filed at 4/10/2019 1402  Gross per 24 hour   Intake 100 ml   Output 750 ml   Net -650 ml     I/O last 3 completed shifts: In: 200 [I.V.:100; IV Piggyback:100]  Out: 600 [Urine:600]    Safety Concerns: At Risk for Falls    Impairments/Disabilities:      None    Nutrition Therapy:  Current Nutrition Therapy:   - PT. NPO    Routes of Feeding: None  Liquids: No Liquids  Daily Fluid Restriction: no  Last Modified Barium Swallow with Video (Video Swallowing Test): not done    Treatments at the Time of Hospital Discharge:   Respiratory Treatments: ***  Oxygen Therapy:  is on oxygen at 3 L/min per nasal cannula. Ventilator:    - No ventilator support    Rehab Therapies: Physical Therapy and Occupational Therapy  Weight Bearing Status/Restrictions: No weight bearing restirctions  Other Medical Equipment (for information only, NOT a DME order):  ***  Other Treatments: ***    Patient's personal belongings (please select all that are sent with patient):  ***    RN SIGNATURE:  Electronically signed by Michelle Degroot RN on 4/10/19 at 4:17 PM    CASE MANAGEMENT/SOCIAL WORK SECTION    Inpatient Status Date: 4/6/19    Readmission Risk Assessment Score:  Readmission Risk              Risk of Unplanned Readmission:        19           Discharging to Facility/ Agency   · Name: [facility]  · Address:  · Phone:  · Fax:    Dialysis Facility (if applicable)   · Name:  · Address:  · Dialysis Schedule:  · Phone:  · Fax:    / signature: Electronically signed by YANNA Ritchie on 4/10/19 at 3:00 PM    PHYSICIAN SECTION    Prognosis: Fair    Condition at Discharge: Stable    Rehab Potential (if transferring to Rehab): {Prognosis:1691993498}    Recommended Labs or Other Treatments After Discharge: ***    Physician Certification: I certify the above information and transfer of Miki Johns  is necessary for the continuing treatment of the diagnosis listed and that he requires Hospice for less 30 days. Update Admission H&P: {CHP DME Changes in QVPTJ:[id number]}    PHYSICIAN SIGNATURE:  Electronically signed by Sagar Harry DO on 4/10/19 at 3:07 PM

## 2019-04-10 NOTE — PROGRESS NOTES
41.7 39.9 38.0    216 223     Recent Labs     04/09/19  0549 04/10/19  0729   * 158*   K 3.4* 3.2*   * 118*   CO2 20* 22   BUN 12 11   CREATININE 0.8 0.7   CALCIUM 7.9* 8.1*     No results for input(s): AST, ALT, BILIDIR, BILITOT, ALKPHOS in the last 72 hours. No results for input(s): INR in the last 72 hours. No results for input(s): Georga Rout in the last 72 hours. No results for input(s): AST, ALT, ALB, BILIDIR, BILITOT, ALKPHOS in the last 72 hours. No results for input(s): LACTA in the last 72 hours.   No results found for: Korina Parra Results   Component Value Date    AMMONIA 23.7 06/18/2018    AMMONIA 18.0 03/08/2018    AMMONIA 20.0 05/25/2017       Assessment:    Active Hospital Problems    Diagnosis Date Noted    Severe sepsis ([facility] [A41.9, R65.20] 04/06/2019   probably due to GN bacteremia  Neurogenic bladder  FTT  Metabolic encephalopathy   UTI  Hypoxia  IIDM                 Plan:   to SNF with hospice        Electronically signed by Chantel Olvera DO on 4/10/2019 at 2:47 PM

## 2019-04-10 NOTE — CARE COORDINATION
Social work made aware by liaison Cris MEJIAS that patient's dtr would like referral to [address] with Hospice. SW made referral to [address], awaiting acceptance. Electronically signed by YANNA Chu on 4/10/2019 at 11:29 AM    Addendum:  SW spoke with liaison [place], no bed available. SW called dtr, no answer lvm. SW called son, he will be here shortly to discuss. SW will follow up in room for additional choices.   Electronically signed by YANNA Chu on 4/10/2019 at 12:45 PM

## 2019-04-10 NOTE — PROGRESS NOTES
Received call from Samina Rangel at [facility]- they do not need any equipment delivered for the patient prior to arrival.  Dr. Anabella Acevedo will be the physician following patient at the facility.  set up transport for 1800. Spoke with patients son Mason White over the phone and asked him to meet with [facility], [facility] nurse when patient arrives at [facility] so that hospice consents can be signed and patient admitted. Mason White will call [facility] when patient arrives at facility. Message left with Dr. Anabella Acevedo regarding hospice consult for at [facility] to follow patient.

## 2019-04-11 NOTE — DISCHARGE SUMMARY
Hospital Medicine Discharge Summary    Patient: Minal Wasserman     Gender: male  : 1940   Age: [de-identified] y.o. 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 On merro. His son has decided to stop  Anything by mouth. He states his dad will not eat, and he put him in hospice. (he said he thinks it is the end). He does want his dad to see urology bc suprapubic pain. Has a suprapubic catheter.  Put on B&O suppositories for suprapubic pain, to go to SNF with hospice today    Ct unchanged, shows ascites and liver cirrhosis      **    Disposition:  Skilled Facility    Exam:     /62   Pulse 99   Temp 97.7 °F (36.5 °C) (Temporal)   Resp 18   Ht 5' 9\" (1.753 m)   Wt 185 lb 14.4 oz (84.3 kg)   SpO2 96%   BMI 27.45 kg/m²   Gen: well developed  HEENT: NC/AT, Mucous membranes are very dry   Neck: supple, trachea midline,  Heart:  Normal s1/s2, RRR, no murmurs, gallops, or rubs.    Lungs: cta* bilaterally,   Abd: bowel sounds present, suprapubic catheter   Extrem:  No clubbing, cyanosis,   no* edema  Skin: no rashes or lesions  Psych: Not oriented to date, Not oriented to person or Not oriented to place  Neuro: grossly intact, moves all four extremities.    Capillary Refill: Brisk,< 3 seconds   Peripheral Pulses: +2 palpable, equal bilaterally                Consults:     IP CONSULT TO INTERNAL MEDICINE  IP CONSULT TO PALLIATIVE CARE  IP CONSULT TO HOSPICE  IP CONSULT TO INFECTIOUS DISEASES  IP CONSULT TO Medication List as of 4/10/2019  6:09 PM      CONTINUE these medications which have CHANGED    Details   ipratropium-albuterol (DUONEB) 0.5-2.5 (3) MG/3ML SOLN nebulizer solution Inhale 3 mLs into the lungs every 4 hours (while awake), Disp-360 mLDC to SNF           Discharge Medication List as of 4/10/2019  6:09 PM        Discharge Medication List as of 4/10/2019  6:09 PM      STOP taking these medications       bisacodyl (DULCOLAX) 10 MG suppository Comments:   Reason for Stopping:         DULoxetine (CYMBALTA) 60 MG extended release capsule Comments:   Reason for Stopping:         Dextrose-Sodium Chloride (DEXTROSE 5% AND 0.45% NACL) 5-0.45% bolus Comments:   Reason for Stopping:         docusate sodium (COLACE) 100 MG capsule Comments:   Reason for Stopping:         loperamide (IMODIUM) 2 MG capsule Comments:   Reason for Stopping:         magnesium hydroxide (MILK OF MAGNESIA) 400 MG/5ML suspension Comments:   Reason for Stopping:         Mineral Oil-Hydrophil Petrolat OINT Comments:   Reason for Stopping:         cholestyramine (QUESTRAN) 4 g packet Comments:   Reason for Stopping:         senna (SENOKOT) 8.6 MG tablet Comments:   Reason for Stopping:         mirtazapine (REMERON) 15 MG tablet Comments:   Reason for Stopping:         Cholecalciferol (VITAMIN D3) 79478 units CAPS Comments:   Reason for Stopping:         vitamin D (CHOLECALCIFEROL) 1000 UNIT TABS tablet Comments:   Reason for Stopping:         furosemide (LASIX) 20 MG tablet Comments:   Reason for Stopping:         glucagon, rDNA, 1 MG injection Comments:   Reason for Stopping:         insulin glargine (LANTUS) 100 UNIT/ML injection vial Comments:   Reason for Stopping:         divalproex (DEPAKOTE ER) 250 MG extended release tablet Comments:   Reason for Stopping:         furosemide (LASIX) 40 MG tablet Comments:   Reason for Stopping:         spironolactone (ALDACTONE) 50 MG tablet Comments:   Reason for Stopping:         insulin lispro (HUMALOG) 100 UNIT/ML injection vial Comments:   Reason for Stopping:         potassium chloride (KLOR-CON M) 20 MEQ extended release tablet Comments:   Reason for Stopping:         pantoprazole (PROTONIX) 40 MG tablet Comments:   Reason for Stopping:         citalopram (CELEXA) 20 MG tablet Comments:   Reason for Stopping:         acetaminophen (TYLENOL) 325 MG tablet Comments:   Reason for Stopping:         mineral oil-hydrophilic petrolatum (HYDROPHOR) ointment Comments:   Reason for Stopping:         vitamin B-1 100 MG tablet Comments:   Reason for Stopping:         magnesium oxide (MAG-OX) 400 (240 MG) MG tablet Comments:   Reason for Stopping:         apixaban (ELIQUIS) 5 MG TABS tablet Comments:   Reason for Stopping:         fluticasone (FLONASE) 50 MCG/ACT nasal spray Comments:   Reason for Stopping:         aspirin 81 MG EC tablet Comments:   Reason for Stopping:         ferrous sulfate 325 (65 FE) MG tablet Comments:   Reason for Stopping:         mometasone-formoterol (DULERA) 200-5 MCG/ACT inhaler Comments:   Reason for Stopping:         midodrine (PROAMATINE) 10 MG tablet Comments:   Reason for Stopping:         folic acid (FOLVITE) 1 MG tablet Comments:   Reason for Stopping:         donepezil (ARICEPT) 5 MG tablet Comments:   Reason for Stopping: Follow-up appointments:  1 week    Provider Follow-up:    pmd    Condition at Discharge:  Terminal    The patient was seen and examined on day of discharge and this discharge summary is in conjunction with any daily progress note from day of discharge. Time Spent on discharge is 45 minutes  in the examination, evaluation, counseling and review of medications and discharge plan. Signed:    Jose Fofnaa DO   4/11/2019      Thank you Maxi Leary DO for the opportunity to be involved in this patient's care.  If you have any questions or concerns please feel free to contact me at [phone]

## 2019-04-11 NOTE — PROGRESS NOTES
ID Progress Note                [address] [facility] [address], [place], [address]            Phone [phone]     Fax [phone]      Chief complaint   SOB/AMS    Subjective:    Afebrile  Not alert , awkae making unpurposeful moves  agitated            Objective:    Vitals:    04/10/19 0824   BP: 118/62   Pulse: 99   Resp: 18   Temp: 97.7 °F (36.5 °C)   SpO2: 96%     General appearance: Alert, Awake, Oriented times 3, no distress  Skin: Warm and dry  Eyes: Pink palpebral conjunctivae. PERRL  Ears: External ears normal.  Nose/Sinuses: Nares normal. Septum midline. Mucosa normal. No sinus tenderness. Oropharynx: Oropharynx clear with no exudates seen  Neck: Neck supple. Lungs: Lungs clear to auscultation bilaterally. No rhonchi, crackles or wheezes  Heart: S1 S2  Regular rate and rhythm. No rub, murmur or gallop  Abdomen: Abdomen soft, non-tender. BS normal. No masses, organomegaly  Extremities: No edema, Peripheral pulses palpable  Musculoskeletal: Muscular strength appears intact. No joint effusion, tenderness, swelling or warmth        Labs:  Recent Labs     04/08/19  0751 04/09/19  0549 04/10/19  0729   WBC 17.8* 15.5* 12.2*   RBC 4.48 4.12 4.10   HGB 13.4 12.6 12.3*   HCT 41.7 39.9 38.0   MCV 93.1 96.8 92.7   MCH 29.9 30.6 30.0   MCHC 32.1 31.6* 32.4   RDW 16.9* 17.4* 17.2*    216 223   MPV 9.9 10.3 9.9     CMP:    Lab Results   Component Value Date     04/10/2019    K 3.2 04/10/2019     04/10/2019    CO2 22 04/10/2019    BUN 11 04/10/2019    CREATININE 0.7 04/10/2019    GFRAA >60 04/10/2019    LABGLOM >60 04/10/2019    GLUCOSE 190 04/10/2019    GLUCOSE 319 12/30/2011    PROT 6.1 04/05/2019    LABALBU 2.1 04/05/2019    CALCIUM 8.1 04/10/2019    BILITOT 0.8 04/05/2019    ALKPHOS 80 04/05/2019    AST 9 04/05/2019    ALT 7 04/05/2019          Microbiology :  No results for input(s): BC in the last 72 hours. No results for input(s): Kacie Potter in the last 72 hours.   No results for input(s): LABURIN in the last 72 hours. No results for input(s): CULTRESP in the last 72 hours. No results for input(s): WNDABS in the last 72 hours. Radiology :  XR CHEST PORTABLE   Final Result   New right upper lobe infiltrate         CT ABDOMEN PELVIS WO CONTRAST   Final Result      Cirrhosis with ascites unchanged      New right lower lobe pulmonary parenchymal infiltrate with bilateral   pleural effusions         XR CHEST PORTABLE   Final Result   Some residual changes are seen in the lung parenchyma chronic basis. No superimposed acute cardiopulmonary process. URINARY CATHETER OUTPUT (Jessica):  Suprapubic Catheter-Output (mL): 50 mL      Assessment and Plan:      CAUTI/Neuogenic bladder  Ecoli bactremia from above     Plan  - D/C meropenem, change to ceftriaxone , would recommend PO option of PO omnicef 300mg BID fpr 9 days  - follow urine/blood cultures  - if jessica has not been replaced already change it ?  Last time changed  - CT abdo/pelvis with contrast with bactremia- noted  - hospice has been consulted as well  - hospice discussion ongoing            Electronically signed by Jo Ann Garcia MD on 4/10/2019 at 8:55 PM

## 2019-09-16 NOTE — PROGRESS NOTES
PM will s/o, if further needs arise please re consult , thank you.    Chrissy Ramon APRN-CNS,ACHPN Donor Site Anesthesia Type: same as repair anesthesia

## (undated) DEVICE — GAUZE,SPONGE,4"X4",16PLY,XRAY,STRL,LF: Brand: MEDLINE

## (undated) DEVICE — GOWN,SIRUS,FABRNF,XL,20/CS: Brand: MEDLINE

## (undated) DEVICE — NDLCTR: MAG/MAG 30CT 168/CS: Brand: MEDICAL ACTION INDUSTRIES

## (undated) DEVICE — PENCIL ES L3M BTTN SWCH HOLSTER W/ BLDE ELECTRD EDGE

## (undated) DEVICE — CATHETER URETH 20FR BLLN 5CC STD LTX HYDRGEL 2 W F BARDX

## (undated) DEVICE — SOLUTION IV IRRIG WATER 1000ML POUR BRL 2F7114

## (undated) DEVICE — READY WET SKIN SCRUB TRAY-LF: Brand: MEDLINE INDUSTRIES, INC.

## (undated) DEVICE — CONTROL SYRINGE LUER-LOCK TIP: Brand: MONOJECT

## (undated) DEVICE — SPONGE,DRAIN,NONWVN,4"X4",6PLY,STRL,LF: Brand: MEDLINE

## (undated) DEVICE — CAMERA STRYKER 1488 HD GEN

## (undated) DEVICE — GLOVE SURG SZ 75 STD WHT LTX SYN POLYMER BEAD REINF ANTI RL

## (undated) DEVICE — BAG DRNGE COMB PK

## (undated) DEVICE — ELECTRODE PT RET AD L9FT HI MOIST COND ADH HYDRGEL CORDED

## (undated) DEVICE — Z INACTIVE USE 2635503 SOLUTION IRRIG 3000ML ST H2O USP UROMATIC PLAS CONT

## (undated) DEVICE — STANDARD HYPODERMIC NEEDLE,POLYPROPYLENE HUB: Brand: MONOJECT

## (undated) DEVICE — CYSTO PACK: Brand: MEDLINE INDUSTRIES, INC.

## (undated) DEVICE — BAG DRAINAGE CONTAINER 15 LT FLUID COLLCTN

## (undated) DEVICE — BAG DRNGE 2000ML UROLOGY ANTI REFLX CHMBR SAMP PRT

## (undated) DEVICE — CATHETER URET OLV TIP 5FRX70CM FLEXIMA

## (undated) DEVICE — CATHETER ETER URETH 30FR BLLN 5CC LTX 2 W F BARDX LUB